# Patient Record
Sex: MALE | Race: WHITE | NOT HISPANIC OR LATINO
[De-identification: names, ages, dates, MRNs, and addresses within clinical notes are randomized per-mention and may not be internally consistent; named-entity substitution may affect disease eponyms.]

---

## 2014-12-31 RX ORDER — ASPIRIN/CALCIUM CARB/MAGNESIUM 324 MG
1 TABLET ORAL
Qty: 0 | Refills: 0 | COMMUNITY
Start: 2014-12-31

## 2014-12-31 RX ORDER — CARVEDILOL PHOSPHATE 80 MG/1
1 CAPSULE, EXTENDED RELEASE ORAL
Qty: 0 | Refills: 0 | COMMUNITY
Start: 2014-12-31

## 2017-01-20 ENCOUNTER — LABORATORY RESULT (OUTPATIENT)
Age: 67
End: 2017-01-20

## 2017-01-20 ENCOUNTER — APPOINTMENT (OUTPATIENT)
Age: 67
End: 2017-01-20

## 2017-01-20 VITALS
RESPIRATION RATE: 17 BRPM | TEMPERATURE: 98.1 F | SYSTOLIC BLOOD PRESSURE: 131 MMHG | HEIGHT: 69 IN | BODY MASS INDEX: 21.92 KG/M2 | DIASTOLIC BLOOD PRESSURE: 70 MMHG | HEART RATE: 63 BPM | WEIGHT: 148 LBS

## 2017-01-20 RX ORDER — PANTOPRAZOLE SODIUM 40 MG/1
40 TABLET, DELAYED RELEASE ORAL
Refills: 0 | Status: ACTIVE | COMMUNITY

## 2017-01-22 LAB
A1AT SERPL-MCNC: 171 MG/DL
AFP-TM SERPL-MCNC: 2.1 NG/ML
ALBUMIN SERPL ELPH-MCNC: 5 G/DL
ALP BLD-CCNC: 119 U/L
ALT SERPL-CCNC: 46 U/L
ANA SER IF-ACNC: NEGATIVE
ANION GAP SERPL CALC-SCNC: 16 MMOL/L
AST SERPL-CCNC: 51 U/L
BASOPHILS # BLD AUTO: 0.06 K/UL
BASOPHILS NFR BLD AUTO: 1.2 %
BILIRUB SERPL-MCNC: 0.8 MG/DL
BUN SERPL-MCNC: 26 MG/DL
CALCIUM SERPL-MCNC: 10.5 MG/DL
CARDIOLIPIN AB SER IA-ACNC: NEGATIVE
CERULOPLASMIN SERPL-MCNC: 32 MG/DL
CHLORIDE SERPL-SCNC: 95 MMOL/L
CO2 SERPL-SCNC: 24 MMOL/L
CREAT SERPL-MCNC: 1.08 MG/DL
DEPRECATED KAPPA LC FREE/LAMBDA SER: 1.35 RATIO
EOSINOPHIL # BLD AUTO: 0.51 K/UL
EOSINOPHIL NFR BLD AUTO: 10.5 %
FERRITIN SERPL-MCNC: 88 NG/ML
GLUCOSE SERPL-MCNC: 68 MG/DL
HAV IGG+IGM SER QL: REACTIVE
HBV CORE IGG+IGM SER QL: NONREACTIVE
HBV SURFACE AB SER QL: NONREACTIVE
HBV SURFACE AG SER QL: NONREACTIVE
HCT VFR BLD CALC: 32.8 %
HCV AB SER QL: NONREACTIVE
HCV S/CO RATIO: 0.15 S/CO
HGB BLD-MCNC: 11.5 G/DL
IGA SER QL IEP: 395 MG/DL
IGG SER QL IEP: 1490 MG/DL
IGM SER QL IEP: 114 MG/DL
IMM GRANULOCYTES NFR BLD AUTO: 0.2 %
KAPPA LC CSF-MCNC: 3.22 MG/DL
KAPPA LC SERPL-MCNC: 4.36 MG/DL
LKM AB SER QL IF: 0.9 UNITS
LYMPHOCYTES # BLD AUTO: 1.87 K/UL
LYMPHOCYTES NFR BLD AUTO: 38.4 %
MAN DIFF?: NORMAL
MCHC RBC-ENTMCNC: 34.1 PG
MCHC RBC-ENTMCNC: 35.1 GM/DL
MCV RBC AUTO: 97.3 FL
MONOCYTES # BLD AUTO: 0.86 K/UL
MONOCYTES NFR BLD AUTO: 17.7 %
NEUTROPHILS # BLD AUTO: 1.56 K/UL
NEUTROPHILS NFR BLD AUTO: 32 %
PLATELET # BLD AUTO: 85 K/UL
POTASSIUM SERPL-SCNC: 4.7 MMOL/L
PROT SERPL-MCNC: 8.6 G/DL
RBC # BLD: 3.37 M/UL
RBC # FLD: 14.2 %
SODIUM SERPL-SCNC: 135 MMOL/L
TSH SERPL-ACNC: 1.61 UIU/ML
TTG IGA SER IA-ACNC: 17.9 UNITS
TTG IGA SER-ACNC: NEGATIVE
WBC # FLD AUTO: 4.87 K/UL

## 2017-01-22 RX ORDER — AMLODIPINE BESYLATE 5 MG/1
5 TABLET ORAL
Refills: 0 | Status: ACTIVE | COMMUNITY

## 2017-01-22 RX ORDER — MENTHOL/CAMPHOR 0.5 %-0.5%
1000 LOTION (ML) TOPICAL
Refills: 0 | Status: ACTIVE | COMMUNITY

## 2017-01-22 RX ORDER — LOSARTAN POTASSIUM 50 MG/1
50 TABLET, FILM COATED ORAL
Refills: 0 | Status: ACTIVE | COMMUNITY

## 2017-01-22 RX ORDER — GLUCOSA SU 2KCL/CHONDROITIN SU 500-400 MG
500 CAPSULE ORAL
Refills: 0 | Status: ACTIVE | COMMUNITY

## 2017-01-22 RX ORDER — CARVEDILOL 12.5 MG/1
12.5 TABLET, FILM COATED ORAL
Refills: 0 | Status: ACTIVE | COMMUNITY

## 2017-01-22 RX ORDER — FOLIC ACID 1 MG/1
1 TABLET ORAL
Refills: 0 | Status: ACTIVE | COMMUNITY

## 2017-01-22 RX ORDER — ASPIRIN ENTERIC COATED TABLETS 81 MG 81 MG/1
81 TABLET, DELAYED RELEASE ORAL
Refills: 0 | Status: ACTIVE | COMMUNITY

## 2017-01-22 RX ORDER — GLIMEPIRIDE 4 MG/1
4 TABLET ORAL
Refills: 0 | Status: ACTIVE | COMMUNITY

## 2017-01-22 RX ORDER — GUAIFENESIN AND DEXTROMETHORPHAN HYDROBROMIDE 1200; 60 MG/1; MG/1
60-1200 TABLET, EXTENDED RELEASE ORAL
Refills: 0 | Status: ACTIVE | COMMUNITY

## 2017-01-22 RX ORDER — BISMUTH SUBSALICYLATE 525 MG/1
TABLET ORAL
Refills: 0 | Status: ACTIVE | COMMUNITY

## 2017-01-23 LAB — MPO AB + PR3 PNL SER: NORMAL

## 2017-01-26 LAB — SMOOTH MUSCLE AB SER QL IF: ABNORMAL

## 2017-01-27 LAB — SOLUBLE LIVER IGG SER IA-ACNC: < 20.1 UNITS

## 2017-01-31 ENCOUNTER — APPOINTMENT (OUTPATIENT)
Dept: ULTRASOUND IMAGING | Facility: IMAGING CENTER | Age: 67
End: 2017-01-31

## 2017-01-31 ENCOUNTER — OUTPATIENT (OUTPATIENT)
Dept: OUTPATIENT SERVICES | Facility: HOSPITAL | Age: 67
LOS: 1 days | End: 2017-01-31
Payer: MEDICARE

## 2017-01-31 DIAGNOSIS — R74.8 ABNORMAL LEVELS OF OTHER SERUM ENZYMES: ICD-10-CM

## 2017-01-31 PROCEDURE — 76700 US EXAM ABDOM COMPLETE: CPT

## 2017-02-08 RX ORDER — LORATADINE 5 MG/5 ML
10 SOLUTION, ORAL ORAL
Refills: 0 | Status: DISCONTINUED | COMMUNITY
End: 2017-02-08

## 2017-04-27 ENCOUNTER — APPOINTMENT (OUTPATIENT)
Age: 67
End: 2017-04-27

## 2017-04-27 VITALS
DIASTOLIC BLOOD PRESSURE: 67 MMHG | WEIGHT: 152 LBS | TEMPERATURE: 97.6 F | HEART RATE: 62 BPM | SYSTOLIC BLOOD PRESSURE: 130 MMHG | HEIGHT: 69 IN | BODY MASS INDEX: 22.51 KG/M2 | RESPIRATION RATE: 17 BRPM

## 2017-04-27 DIAGNOSIS — Z87.891 PERSONAL HISTORY OF NICOTINE DEPENDENCE: ICD-10-CM

## 2017-04-27 DIAGNOSIS — Z78.9 OTHER SPECIFIED HEALTH STATUS: ICD-10-CM

## 2017-05-17 ENCOUNTER — APPOINTMENT (OUTPATIENT)
Age: 67
End: 2017-05-17

## 2017-06-02 ENCOUNTER — TRANSCRIPTION ENCOUNTER (OUTPATIENT)
Age: 67
End: 2017-06-02

## 2017-07-06 ENCOUNTER — TRANSCRIPTION ENCOUNTER (OUTPATIENT)
Age: 67
End: 2017-07-06

## 2017-07-24 ENCOUNTER — APPOINTMENT (OUTPATIENT)
Age: 67
End: 2017-07-24

## 2017-07-24 ENCOUNTER — LABORATORY RESULT (OUTPATIENT)
Age: 67
End: 2017-07-24

## 2017-07-24 VITALS
TEMPERATURE: 97.7 F | SYSTOLIC BLOOD PRESSURE: 109 MMHG | HEIGHT: 69 IN | DIASTOLIC BLOOD PRESSURE: 64 MMHG | RESPIRATION RATE: 17 BRPM | HEART RATE: 62 BPM

## 2017-07-25 LAB
AFP-TM SERPL-MCNC: 2 NG/ML
ALBUMIN SERPL ELPH-MCNC: 4.9 G/DL
ALP BLD-CCNC: 105 U/L
ALT SERPL-CCNC: 50 U/L
ANION GAP SERPL CALC-SCNC: 18 MMOL/L
AST SERPL-CCNC: 38 U/L
BASOPHILS # BLD AUTO: 0.13 K/UL
BASOPHILS NFR BLD AUTO: 2.9 %
BILIRUB SERPL-MCNC: 0.5 MG/DL
BUN SERPL-MCNC: 22 MG/DL
CALCIUM SERPL-MCNC: 10.2 MG/DL
CHLORIDE SERPL-SCNC: 94 MMOL/L
CO2 SERPL-SCNC: 21 MMOL/L
CREAT SERPL-MCNC: 1.11 MG/DL
CRP SERPL-MCNC: <0.2 MG/DL
EOSINOPHIL # BLD AUTO: 0.87 K/UL
EOSINOPHIL NFR BLD AUTO: 20 %
ERYTHROCYTE [SEDIMENTATION RATE] IN BLOOD BY WESTERGREN METHOD: 3 MM/HR
GLUCOSE SERPL-MCNC: 114 MG/DL
HCT VFR BLD CALC: 31 %
HGB BLD-MCNC: 11 G/DL
INR PPP: 0.97 RATIO
LYMPHOCYTES # BLD AUTO: 1.16 K/UL
LYMPHOCYTES NFR BLD AUTO: 26.7 %
MAN DIFF?: NORMAL
MCHC RBC-ENTMCNC: 32.5 PG
MCHC RBC-ENTMCNC: 35.5 GM/DL
MCV RBC AUTO: 91.7 FL
MONOCYTES # BLD AUTO: 0.71 K/UL
MONOCYTES NFR BLD AUTO: 16.2 %
NEUTROPHILS # BLD AUTO: 1.41 K/UL
NEUTROPHILS NFR BLD AUTO: 32.3 %
PLATELET # BLD AUTO: 115 K/UL
POTASSIUM SERPL-SCNC: 5.5 MMOL/L
PROT SERPL-MCNC: 8.3 G/DL
PT BLD: 10.9 SEC
RBC # BLD: 3.38 M/UL
RBC # FLD: 13.8 %
SODIUM SERPL-SCNC: 133 MMOL/L
WBC # FLD AUTO: 4.36 K/UL

## 2017-07-27 RX ORDER — FUROSEMIDE 20 MG/1
20 TABLET ORAL
Refills: 0 | Status: DISCONTINUED | COMMUNITY
End: 2017-07-27

## 2017-08-11 ENCOUNTER — APPOINTMENT (OUTPATIENT)
Dept: MRI IMAGING | Facility: CLINIC | Age: 67
End: 2017-08-11
Payer: MEDICARE

## 2017-08-11 ENCOUNTER — OUTPATIENT (OUTPATIENT)
Dept: OUTPATIENT SERVICES | Facility: HOSPITAL | Age: 67
LOS: 1 days | End: 2017-08-11
Payer: MEDICARE

## 2017-08-11 DIAGNOSIS — Z00.8 ENCOUNTER FOR OTHER GENERAL EXAMINATION: ICD-10-CM

## 2017-08-11 DIAGNOSIS — R74.8 ABNORMAL LEVELS OF OTHER SERUM ENZYMES: ICD-10-CM

## 2017-08-11 PROCEDURE — A9585: CPT

## 2017-08-11 PROCEDURE — 74183 MRI ABD W/O CNTR FLWD CNTR: CPT

## 2017-08-11 PROCEDURE — 74183 MRI ABD W/O CNTR FLWD CNTR: CPT | Mod: 26

## 2017-09-06 ENCOUNTER — APPOINTMENT (OUTPATIENT)
Age: 67
End: 2017-09-06
Payer: MEDICARE

## 2017-09-06 VITALS
DIASTOLIC BLOOD PRESSURE: 66 MMHG | SYSTOLIC BLOOD PRESSURE: 128 MMHG | RESPIRATION RATE: 17 BRPM | HEIGHT: 69 IN | TEMPERATURE: 98.4 F | HEART RATE: 63 BPM

## 2017-09-06 PROCEDURE — 99214 OFFICE O/P EST MOD 30 MIN: CPT

## 2017-09-06 RX ORDER — MV-MIN/FOLIC/K1/LYCOPEN/LUTEIN 300-60 MCG
TABLET ORAL
Refills: 0 | Status: DISCONTINUED | COMMUNITY
End: 2017-09-06

## 2017-09-06 RX ORDER — GLUCOSAMINE/MSM/CHONDROIT SULF 500-166.6
1500 TABLET ORAL
Refills: 0 | Status: DISCONTINUED | COMMUNITY
End: 2017-09-06

## 2017-09-06 RX ORDER — OMEGA-3 FATTY ACIDS/FISH OIL 360-1200MG
1200 CAPSULE ORAL
Refills: 0 | Status: DISCONTINUED | COMMUNITY
End: 2017-09-06

## 2017-09-07 LAB
ALBUMIN SERPL ELPH-MCNC: 4.6 G/DL
ALP BLD-CCNC: 101 U/L
ALT SERPL-CCNC: 40 U/L
ANION GAP SERPL CALC-SCNC: 17 MMOL/L
AST SERPL-CCNC: 40 U/L
BASOPHILS # BLD AUTO: 0.06 K/UL
BASOPHILS NFR BLD AUTO: 1 %
BILIRUB SERPL-MCNC: 0.6 MG/DL
BUN SERPL-MCNC: 21 MG/DL
CALCIUM SERPL-MCNC: 10.1 MG/DL
CHLORIDE SERPL-SCNC: 97 MMOL/L
CO2 SERPL-SCNC: 22 MMOL/L
CREAT SERPL-MCNC: 1 MG/DL
EOSINOPHIL # BLD AUTO: 0.88 K/UL
EOSINOPHIL NFR BLD AUTO: 14.6 %
GLUCOSE SERPL-MCNC: 95 MG/DL
HCT VFR BLD CALC: 31.2 %
HGB BLD-MCNC: 10.9 G/DL
IMM GRANULOCYTES NFR BLD AUTO: 0.2 %
LYMPHOCYTES # BLD AUTO: 1.96 K/UL
LYMPHOCYTES NFR BLD AUTO: 32.6 %
MAN DIFF?: NORMAL
MCHC RBC-ENTMCNC: 32.4 PG
MCHC RBC-ENTMCNC: 34.9 GM/DL
MCV RBC AUTO: 92.9 FL
MONOCYTES # BLD AUTO: 0.9 K/UL
MONOCYTES NFR BLD AUTO: 15 %
NEUTROPHILS # BLD AUTO: 2.2 K/UL
NEUTROPHILS NFR BLD AUTO: 36.6 %
PLATELET # BLD AUTO: 101 K/UL
POTASSIUM SERPL-SCNC: 5.3 MMOL/L
PROT SERPL-MCNC: 8.6 G/DL
RBC # BLD: 3.36 M/UL
RBC # FLD: 14.2 %
SODIUM SERPL-SCNC: 136 MMOL/L
WBC # FLD AUTO: 6.01 K/UL

## 2017-10-30 ENCOUNTER — APPOINTMENT (OUTPATIENT)
Age: 67
End: 2017-10-30

## 2018-02-26 ENCOUNTER — OUTPATIENT (OUTPATIENT)
Dept: OUTPATIENT SERVICES | Facility: HOSPITAL | Age: 68
LOS: 1 days | End: 2018-02-26
Payer: MEDICARE

## 2018-02-26 ENCOUNTER — APPOINTMENT (OUTPATIENT)
Dept: MRI IMAGING | Facility: CLINIC | Age: 68
End: 2018-02-26
Payer: MEDICARE

## 2018-02-26 DIAGNOSIS — K74.60 UNSPECIFIED CIRRHOSIS OF LIVER: ICD-10-CM

## 2018-02-26 DIAGNOSIS — Z00.8 ENCOUNTER FOR OTHER GENERAL EXAMINATION: ICD-10-CM

## 2018-02-26 PROCEDURE — 82565 ASSAY OF CREATININE: CPT

## 2018-02-26 PROCEDURE — A9585: CPT

## 2018-02-26 PROCEDURE — 74183 MRI ABD W/O CNTR FLWD CNTR: CPT

## 2018-02-26 PROCEDURE — 74183 MRI ABD W/O CNTR FLWD CNTR: CPT | Mod: 26

## 2018-03-15 ENCOUNTER — APPOINTMENT (OUTPATIENT)
Age: 68
End: 2018-03-15
Payer: MEDICARE

## 2018-03-15 VITALS
BODY MASS INDEX: 23.55 KG/M2 | WEIGHT: 159 LBS | DIASTOLIC BLOOD PRESSURE: 63 MMHG | TEMPERATURE: 98 F | HEART RATE: 64 BPM | HEIGHT: 69 IN | SYSTOLIC BLOOD PRESSURE: 113 MMHG | RESPIRATION RATE: 14 BRPM

## 2018-03-15 DIAGNOSIS — K74.60 UNSPECIFIED CIRRHOSIS OF LIVER: ICD-10-CM

## 2018-03-15 DIAGNOSIS — R74.8 ABNORMAL LEVELS OF OTHER SERUM ENZYMES: ICD-10-CM

## 2018-03-15 DIAGNOSIS — R16.1 SPLENOMEGALY, NOT ELSEWHERE CLASSIFIED: ICD-10-CM

## 2018-03-15 LAB
ALBUMIN SERPL ELPH-MCNC: 4.9 G/DL
ALP BLD-CCNC: 85 U/L
ALT SERPL-CCNC: 36 U/L
ANION GAP SERPL CALC-SCNC: 16 MMOL/L
AST SERPL-CCNC: 35 U/L
BASOPHILS # BLD AUTO: 0.06 K/UL
BASOPHILS NFR BLD AUTO: 1.3 %
BILIRUB SERPL-MCNC: 0.6 MG/DL
BUN SERPL-MCNC: 26 MG/DL
CALCIUM SERPL-MCNC: 10.3 MG/DL
CHLORIDE SERPL-SCNC: 99 MMOL/L
CO2 SERPL-SCNC: 20 MMOL/L
CREAT SERPL-MCNC: 1.37 MG/DL
EOSINOPHIL # BLD AUTO: 0.71 K/UL
EOSINOPHIL NFR BLD AUTO: 15.4 %
GLUCOSE SERPL-MCNC: 207 MG/DL
HCT VFR BLD CALC: 30 %
HGB BLD-MCNC: 10.7 G/DL
IMM GRANULOCYTES NFR BLD AUTO: 0.2 %
INR PPP: 0.96 RATIO
LYMPHOCYTES # BLD AUTO: 1.53 K/UL
LYMPHOCYTES NFR BLD AUTO: 33.3 %
MAN DIFF?: NORMAL
MCHC RBC-ENTMCNC: 33.3 PG
MCHC RBC-ENTMCNC: 35.7 GM/DL
MCV RBC AUTO: 93.5 FL
MONOCYTES # BLD AUTO: 0.66 K/UL
MONOCYTES NFR BLD AUTO: 14.3 %
NEUTROPHILS # BLD AUTO: 1.63 K/UL
NEUTROPHILS NFR BLD AUTO: 35.5 %
PLATELET # BLD AUTO: 101 K/UL
POTASSIUM SERPL-SCNC: 4.9 MMOL/L
PROT SERPL-MCNC: 8.5 G/DL
PT BLD: 10.8 SEC
RBC # BLD: 3.21 M/UL
RBC # FLD: 14.2 %
SODIUM SERPL-SCNC: 135 MMOL/L
WBC # FLD AUTO: 4.6 K/UL

## 2018-03-15 PROCEDURE — 99214 OFFICE O/P EST MOD 30 MIN: CPT

## 2018-03-15 RX ORDER — AZELASTINE HYDROCHLORIDE 0.5 MG/ML
0.05 SOLUTION/ DROPS OPHTHALMIC
Refills: 0 | Status: ACTIVE | COMMUNITY

## 2018-03-15 RX ORDER — VALACYCLOVIR 500 MG/1
500 TABLET, FILM COATED ORAL
Qty: 21 | Refills: 0 | Status: ACTIVE | COMMUNITY
Start: 2017-03-23

## 2018-03-16 LAB — AFP-TM SERPL-MCNC: 1.9 NG/ML

## 2018-07-23 PROBLEM — Z78.9 ALCOHOL USE: Status: ACTIVE | Noted: 2017-01-20

## 2019-04-12 ENCOUNTER — TRANSCRIPTION ENCOUNTER (OUTPATIENT)
Age: 69
End: 2019-04-12

## 2019-05-01 ENCOUNTER — INPATIENT (INPATIENT)
Facility: HOSPITAL | Age: 69
LOS: 1 days | Discharge: HOME | DRG: 65 | End: 2019-05-03
Attending: PSYCHIATRY & NEUROLOGY | Admitting: PSYCHIATRY & NEUROLOGY
Payer: MEDICARE

## 2019-05-01 VITALS
DIASTOLIC BLOOD PRESSURE: 83 MMHG | SYSTOLIC BLOOD PRESSURE: 147 MMHG | HEIGHT: 69 IN | WEIGHT: 158.73 LBS | TEMPERATURE: 98 F | RESPIRATION RATE: 20 BRPM | OXYGEN SATURATION: 100 % | HEART RATE: 65 BPM

## 2019-05-01 DIAGNOSIS — I63.9 CEREBRAL INFARCTION, UNSPECIFIED: ICD-10-CM

## 2019-05-01 LAB
ALBUMIN SERPL ELPH-MCNC: 4.3 G/DL — SIGNIFICANT CHANGE UP (ref 3.3–5)
ALP SERPL-CCNC: 95 U/L — SIGNIFICANT CHANGE UP (ref 40–120)
ALT FLD-CCNC: 29 U/L — SIGNIFICANT CHANGE UP (ref 10–45)
ANION GAP SERPL CALC-SCNC: 13 MMOL/L — SIGNIFICANT CHANGE UP (ref 5–17)
APTT BLD: 28.7 SEC — SIGNIFICANT CHANGE UP (ref 27.5–36.3)
AST SERPL-CCNC: 37 U/L — SIGNIFICANT CHANGE UP (ref 10–40)
BASOPHILS # BLD AUTO: 0 K/UL — SIGNIFICANT CHANGE UP (ref 0–0.2)
BASOPHILS NFR BLD AUTO: 0.5 % — SIGNIFICANT CHANGE UP (ref 0–2)
BILIRUB SERPL-MCNC: 0.5 MG/DL — SIGNIFICANT CHANGE UP (ref 0.2–1.2)
BLD GP AB SCN SERPL QL: NEGATIVE — SIGNIFICANT CHANGE UP
BUN SERPL-MCNC: 18 MG/DL — SIGNIFICANT CHANGE UP (ref 7–23)
CALCIUM SERPL-MCNC: 9.9 MG/DL — SIGNIFICANT CHANGE UP (ref 8.4–10.5)
CHLORIDE SERPL-SCNC: 94 MMOL/L — LOW (ref 96–108)
CO2 SERPL-SCNC: 19 MMOL/L — LOW (ref 22–31)
CREAT SERPL-MCNC: 0.99 MG/DL — SIGNIFICANT CHANGE UP (ref 0.5–1.3)
EOSINOPHIL # BLD AUTO: 1.6 K/UL — HIGH (ref 0–0.5)
EOSINOPHIL NFR BLD AUTO: 25.4 % — HIGH (ref 0–6)
GLUCOSE BLDC GLUCOMTR-MCNC: 104 MG/DL — HIGH (ref 70–99)
GLUCOSE BLDC GLUCOMTR-MCNC: 90 MG/DL — SIGNIFICANT CHANGE UP (ref 70–99)
GLUCOSE SERPL-MCNC: 154 MG/DL — HIGH (ref 70–99)
HCT VFR BLD CALC: 29.9 % — LOW (ref 39–50)
HGB BLD-MCNC: 10.9 G/DL — LOW (ref 13–17)
INR BLD: 1.03 RATIO — SIGNIFICANT CHANGE UP (ref 0.88–1.16)
LG PLATELETS BLD QL AUTO: SLIGHT — SIGNIFICANT CHANGE UP
LYMPHOCYTES # BLD AUTO: 1.8 K/UL — SIGNIFICANT CHANGE UP (ref 1–3.3)
LYMPHOCYTES # BLD AUTO: 28 % — SIGNIFICANT CHANGE UP (ref 13–44)
MCHC RBC-ENTMCNC: 34.1 PG — HIGH (ref 27–34)
MCHC RBC-ENTMCNC: 36.5 GM/DL — HIGH (ref 32–36)
MCV RBC AUTO: 93.3 FL — SIGNIFICANT CHANGE UP (ref 80–100)
MONOCYTES # BLD AUTO: 1 K/UL — HIGH (ref 0–0.9)
MONOCYTES NFR BLD AUTO: 15.5 % — HIGH (ref 2–14)
NEUTROPHILS # BLD AUTO: 2 K/UL — SIGNIFICANT CHANGE UP (ref 1.8–7.4)
NEUTROPHILS NFR BLD AUTO: 30.6 % — LOW (ref 43–77)
PLAT MORPH BLD: NORMAL — SIGNIFICANT CHANGE UP
PLATELET # BLD AUTO: 128 K/UL — LOW (ref 150–400)
POTASSIUM SERPL-MCNC: 5.1 MMOL/L — SIGNIFICANT CHANGE UP (ref 3.5–5.3)
POTASSIUM SERPL-SCNC: 5.1 MMOL/L — SIGNIFICANT CHANGE UP (ref 3.5–5.3)
PROT SERPL-MCNC: 7.9 G/DL — SIGNIFICANT CHANGE UP (ref 6–8.3)
PROTHROM AB SERPL-ACNC: 11.7 SEC — SIGNIFICANT CHANGE UP (ref 10–12.9)
RBC # BLD: 3.2 M/UL — LOW (ref 4.2–5.8)
RBC # FLD: 12.9 % — SIGNIFICANT CHANGE UP (ref 10.3–14.5)
RBC BLD AUTO: SIGNIFICANT CHANGE UP
RH IG SCN BLD-IMP: POSITIVE — SIGNIFICANT CHANGE UP
SODIUM SERPL-SCNC: 126 MMOL/L — LOW (ref 135–145)
WBC # BLD: 6.2 K/UL — SIGNIFICANT CHANGE UP (ref 3.8–10.5)
WBC # FLD AUTO: 6.2 K/UL — SIGNIFICANT CHANGE UP (ref 3.8–10.5)

## 2019-05-01 PROCEDURE — 99284 EMERGENCY DEPT VISIT MOD MDM: CPT | Mod: GC,25

## 2019-05-01 PROCEDURE — 93010 ELECTROCARDIOGRAM REPORT: CPT | Mod: GC

## 2019-05-01 PROCEDURE — 70496 CT ANGIOGRAPHY HEAD: CPT | Mod: 26

## 2019-05-01 PROCEDURE — 70498 CT ANGIOGRAPHY NECK: CPT | Mod: 26

## 2019-05-01 RX ORDER — ALTEPLASE 100 MG
58.1 KIT INTRAVENOUS ONCE
Qty: 0 | Refills: 0 | Status: COMPLETED | OUTPATIENT
Start: 2019-05-01 | End: 2019-05-01

## 2019-05-01 RX ORDER — FOLIC ACID 0.8 MG
1 TABLET ORAL
Qty: 0 | Refills: 0 | COMMUNITY

## 2019-05-01 RX ORDER — AZELASTINE 137 UG/1
1 SPRAY, METERED NASAL
Qty: 0 | Refills: 0 | COMMUNITY

## 2019-05-01 RX ORDER — GLIMEPIRIDE 1 MG
1 TABLET ORAL
Qty: 0 | Refills: 0 | COMMUNITY

## 2019-05-01 RX ORDER — DEXTROSE 50 % IN WATER 50 %
12.5 SYRINGE (ML) INTRAVENOUS ONCE
Qty: 0 | Refills: 0 | Status: DISCONTINUED | OUTPATIENT
Start: 2019-05-01 | End: 2019-05-03

## 2019-05-01 RX ORDER — DEXTROSE 50 % IN WATER 50 %
15 SYRINGE (ML) INTRAVENOUS ONCE
Qty: 0 | Refills: 0 | Status: DISCONTINUED | OUTPATIENT
Start: 2019-05-01 | End: 2019-05-03

## 2019-05-01 RX ORDER — SODIUM CHLORIDE 9 MG/ML
1000 INJECTION, SOLUTION INTRAVENOUS
Qty: 0 | Refills: 0 | Status: DISCONTINUED | OUTPATIENT
Start: 2019-05-01 | End: 2019-05-03

## 2019-05-01 RX ORDER — INSULIN LISPRO 100/ML
VIAL (ML) SUBCUTANEOUS EVERY 6 HOURS
Qty: 0 | Refills: 0 | Status: DISCONTINUED | OUTPATIENT
Start: 2019-05-01 | End: 2019-05-02

## 2019-05-01 RX ORDER — ALTEPLASE 100 MG
6.5 KIT INTRAVENOUS ONCE
Qty: 0 | Refills: 0 | Status: COMPLETED | OUTPATIENT
Start: 2019-05-01 | End: 2019-05-01

## 2019-05-01 RX ORDER — GLUCAGON INJECTION, SOLUTION 0.5 MG/.1ML
1 INJECTION, SOLUTION SUBCUTANEOUS ONCE
Qty: 0 | Refills: 0 | Status: DISCONTINUED | OUTPATIENT
Start: 2019-05-01 | End: 2019-05-03

## 2019-05-01 RX ORDER — PANTOPRAZOLE SODIUM 20 MG/1
1 TABLET, DELAYED RELEASE ORAL
Qty: 0 | Refills: 0 | COMMUNITY

## 2019-05-01 RX ADMIN — ALTEPLASE 390 MILLIGRAM(S): KIT at 13:49

## 2019-05-01 RX ADMIN — ALTEPLASE 58.1 MILLIGRAM(S): KIT at 13:50

## 2019-05-01 NOTE — H&P ADULT - ATTENDING COMMENTS
68-year-old right-handed white gentleman first evaluated at [facility] on 5/2/19 with right hemiparesis. On 5/1/19, while at work, he felt mildly clumsy while walking. He then had sudden difficulty with his hand writing or holding objects in his right hand. His wife noted a subtle speech disturbance. He was treated with IV TPA. He has a history of 2 seizures years prior to admission, and had been followed by Dr. Cullen Garduno (neurology). He also has a history of CHF, ulcerative colitis, nonalcoholic cirrhosis, and thrombocytopenia. ROS otherwise negative. Exam. Alert and attentive;? Subtle dysarthria; fine finger movements subtly slowed on the right; no drift; deltoids 3/5 bilaterally, most likely due to rotator cuff injury, and otherwise power was 5/5 throughout; gait not tested; remainder of neurologic exam was nonfocal. Impression. On 5/1/19, he developed right hand weakness and clumsiness, and possibly more generalized right hemiparesis or imbalance, but this is uncertain, and was treated with IV TPA. His presentation is consistent with left brain dysfunction, perhaps a small infarct of uncertain mechanism. The possible relevance of inflammation and hypercoagulability related to ulcerative colitis is unknown. He also has mild thrombocytopenia which will need to be monitored in the setting of antiplatelet use. Plan. MRI brain; TTE; further management will be guided by results of MRI; aspirin (we'll avoid dual antiplatelet therapy given thrombocytopenia and cirrhosis); PT/OT.

## 2019-05-01 NOTE — H&P ADULT - NSHPPHYSICALEXAM_GEN_ALL_CORE
PHYSICAL EXAMINATION:  General: Well-developed, well nourished, in no acute distress.  Skin: no rash, no edema noted  Lung: no respiratory distress noted  Neurologic:  - Mental Status:  Alert, awake, oriented to person, place, and time; Speech is mildly hypophonic but fluent with intact naming, repetition, and comprehension; crosses midline, no extinction or neglect noted;   - Cranial Nerves II-XII:  VFF, EOMI, PERRLA, V1-V3 intact, no facial asymmetry, t/p midline, SCM/trap intact.  - Motor:  RUE 4-/5 w/ drift and weak , LUE orbiting around RUE; RLE 4+/5. Normal muscle bulk and tone throughout. No myoclonus or tremor.  - Sensory:  Intact to light touch throughout.  - Coordination:  Finger-nose-finger without dysmetria.  Rapid alternating hand movements slowed on RUE;   - Gait:   R leaning gait; romberg positive with delay of few seconds

## 2019-05-01 NOTE — ED ADULT NURSE NOTE - OBJECTIVE STATEMENT
68yom PMH dm2, htn, cad, cabg presents to ED c/o slowed speech and right hand weakness since 9:40 this morning. Code stoke called at 12:28. Pt at the ED not noted to have slurred speech. Minimal R sided facial droop. R sided hand weakness, but pt also states he had rotator surgery in the past. Denies SOB, CP. back pain, burning upon urination. Safety and comfort measures provided. Wife at bedside. 68yom PMH dm2, htn, cad, cabg presents to ED c/o slowed speech and right hand weakness since 9:40 this morning. Pt states he was writing and felt wekness in right hand. Code stoke called at 12:28. Pt at the ED not noted to have slurred speech. Minimal R sided facial droop. R sided hand weakness, but pt also states he had rotator surgery in the past. Denies SOB, CP. back pain, burning upon urination. Safety and comfort measures provided. Wife at bedside.    12:49, pt received tpa. MD Carvajal at bedside. See flowsheet for neuro checks. 68yom PMH dm2, htn, cad, cabg presents to ED c/o slowed speech and right hand weakness since 9:40 this morning. Pt states he was writing and felt wekness in right hand. Code stomax called at 12:28. Pt at the ED not noted to have slurred speech. Minimal R sided facial droop. R sided hand weakness, but pt also states he had rotator surgery in the past. Denies SOB, CP. back pain, burning upon urination. Safety and comfort measures provided. Wife at bedside.    13:49, pt received tpa. MD Carvajal at bedside. See flowsheet for neuro checks.  Finished TPA at ED 14:49. Face to face report with BERNADETTE Madera in ED.

## 2019-05-01 NOTE — ED ADULT TRIAGE NOTE - CHIEF COMPLAINT QUOTE
weakness of arms and legs b/l since 945am. slowed speech with R facial droop. code stroke initiated at 1228

## 2019-05-01 NOTE — PATIENT PROFILE ADULT - FUNCTIONAL SCREEN CURRENT LEVEL: COMMUNICATION, MLM
Offered pt shower for this afternoon, pt accepted.    0 = understands/communicates without difficulty

## 2019-05-01 NOTE — ED PROVIDER NOTE - OBJECTIVE STATEMENT
69yo male p/w slurred speech and right hand weakness since 945am. Pt. reports that he noticed that his writing was not as fluid. Denies any cp, sob, n/v, changes in vision. Pt. did reports some lower extremity weakness. 67yo male p/w slurred speech and right hand weakness since 945am. Pt. reports that he noticed that his writing was not as fluid. Denies any cp, sob, n/v, changes in vision. Pt. did reports some lower extremity weakness. Pt. has history of CAD, s/p CABG, HTN, DM, CHF, thrombocytopenia, cirrhosis.

## 2019-05-01 NOTE — ED PROVIDER NOTE - PMH
CAD (coronary artery disease)    CHF (congestive heart failure)    Cholelithiasis    Diabetes mellitus    HTN (hypertension)    Seizure    Ulcerative colitis

## 2019-05-01 NOTE — H&P ADULT - NSHPLABSRESULTS_GEN_ALL_CORE
10.9   6.2   )-----------( 128      ( 01 May 2019 12:41 )             29.9   05-01    126<L>  |  94<L>  |  18  ----------------------------<  154<H>  5.1   |  19<L>  |  0.99    Ca    9.9      01 May 2019 12:41    TPro  7.9  /  Alb  4.3  /  TBili  0.5  /  DBili  x   /  AST  37  /  ALT  29  /  AlkPhos  95  05-01    PT/INR - ( 01 May 2019 12:41 )   PT: 11.7 sec;   INR: 1.03 ratio         PTT - ( 01 May 2019 12:41 )  PTT:28.7 sec    CT/CTA -ve per Dr. Ball

## 2019-05-01 NOTE — H&P ADULT - NSICDXPASTMEDICALHX_GEN_ALL_CORE_FT
PAST MEDICAL HISTORY:  CAD (coronary artery disease)     CHF (congestive heart failure)     Cholelithiasis     Diabetes mellitus     HTN (hypertension)     Seizure     Ulcerative colitis

## 2019-05-01 NOTE — ED PROVIDER NOTE - ATTENDING CONTRIBUTION TO CARE
69 y/o male with the above documented history and HPI who on exam appears well and comfortable. VSs noted, head NC/AT, EOMsI, neck supple s/ audible bruit, lungs CTA, cardiac sounds c/ NADIA throughout precordium, abdomen soft, NT/ND, extremities s/ asymmetry, skin s/ rash and neurologically c/ mild RUE weakness but strong R hand grasp and otherwise intact. Stroke Code triggered. Appropriate screening studies obtained. Evaluated by Stroke Team. Decision made to give t-PA. To be admitted.

## 2019-05-01 NOTE — H&P ADULT - ASSESSMENT
67 yo RH male with PMHx of CAD s/p CABG x 4 (2000), HTN, steroid induced DM, UC (reported in remission as last bleed >20 years ago), 1x seizure (2013 deemed to medication induced), CHF, cholelithiasis, thrombocytopenia and non-alcoholic hepatic cirrhosis presents to ER with complaint of acute episode of difficulty writing with right hand.     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    - goal BP < 180/110  - Neuro checks q2hr in Stroke Unit  - NPO x 12hrs  - Dysphagia screen in 12-24hrs    Imaging/Studies  - MRI brain w/o cont  - MRA brain w/o cont   - MRA neck w/ cont  - Repeat Head CT or MRI in 24hrs evaluate for hemorrhagic conversion or get CTH earlier for change in mental status  - TTE w/ bubble  - Telemetry monitoring   - PT/ OT  - Speech and Swallow Evaluation    Labs  - HgA1c  - Lipid profile    Meds  - ASA 81mg and DVT PPx after 24hrs if no significant bleeding on repeat imaging  - Atorvastatin 80mg QHS    - Patient's and wife's questions and concerns addressed. They both voiced understanding.    case d/w stroke fellow Dr. Goodman 67 yo RH male with PMHx of CAD s/p CABG x 4 (2000), HTN, steroid induced DM, UC (reported in remission as last bleed >20 years ago), 1x seizure (2013 deemed to medication induced), CHF, cholelithiasis, thrombocytopenia and non-alcoholic hepatic cirrhosis presents to ER with complaint of acute episode of difficulty writing with right hand.     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    Post-TPA protocol  - goal BP < 180/110  - Neuro checks q2hr in Stroke Unit  - NPO x 12hrs  - Dysphagia screen in 12-24hrs    Imaging/Studies  - MRI brain w/o cont  - MRA brain w/o cont   - MRA neck w/ cont  - Repeat Head CT or MRI in 24hrs evaluate for hemorrhagic conversion or get CTH earlier for change in mental status  - TTE w/ bubble  - Telemetry monitoring   - PT/ OT  - Speech and Swallow Evaluation    Labs  - HgA1c  - Lipid profile    Meds  - ASA 81mg and DVT PPx after 24hrs if no significant bleeding on repeat imaging  - Atorvastatin 80mg QHS    - Patient's and wife's questions and concerns addressed. They both voiced understanding.    case d/w stroke fellow Dr. Goodman 67 yo RH male with PMHx of CAD s/p CABG x 4 (2000), HTN, steroid induced DM, UC (reported in remission as last bleed >20 years ago), 1x seizure (2013 deemed to medication induced), CHF, cholelithiasis, thrombocytopenia and non-alcoholic hepatic cirrhosis presents to ER with complaint of acute episode of difficulty writing with right hand.     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    Post-TPA protocol  - goal BP < 180/110  - Neuro checks q2hr in Stroke Unit  - NPO x 12hrs  - Dysphagia screen in 12-24hrs    Imaging/Studies  - MRI brain w/o cont  - Repeat Head CT or MRI in 24hrs evaluate for hemorrhagic conversion or get CTH earlier for change in mental status  - TTE w/ bubble  - Telemetry monitoring   - PT/ OT  - Speech and Swallow Evaluation    Labs  - HgA1c  - Lipid profile    Meds  - ASA 81mg and DVT PPx after 24hrs if no significant bleeding on repeat imaging  - Atorvastatin 80mg QHS  ***med rec hotline called - please reconcile    - Patient's and wife's questions and concerns addressed. They both voiced understanding.    case d/w stroke fellow Dr. Goodman

## 2019-05-01 NOTE — H&P ADULT - HISTORY OF PRESENT ILLNESS
67 yo RH male with PMHx of CAD s/p CABG x 4 (2000), HTN, steroid induced DM, UC (reported in remission as last bleed >20 years ago), 1x seizure (2013 deemed to medication induced), CHF, cholelithiasis, thrombocytopenia and non-alcoholic hepatic cirrhosis presents to ER with complaint of acute episode of difficulty writing with right hand. Sx onset/LNW 9:45AM 5/1/2019. Pt and wife report he writes very fast usually but this morning could not even grasp the pen properly and was unable to open car door or get out of vehicle. Pt also endorsed feeling generally weak but worse on the right. Per wife pt was also speaking slowly. Delay in coming to ER occurred as pt confided to wife at a late while at work. Exam significant for mild-mod RUE and RLE weakness ( weaker) w/ subtle drift, and leaning to right while walking. CTH/CTA were without acute findings. Pt was deemed tPA candidate. Extensive discussion of risks vs. benefits were done with patient and wife, including ample time given for them to weigh the options presented. 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 tPA Bolused at 1:49PM.   NIHSS =1, MRS =0  Patient denies trauma, LOC, fever, chills, HA, vision changes, tinnitus, dysarthria, dysphagia, dizziness, chest pain, palpitations, SOB, nausea, vomiting, diarrhea, dysuria and incontinence.

## 2019-05-02 DIAGNOSIS — I63.9 CEREBRAL INFARCTION, UNSPECIFIED: ICD-10-CM

## 2019-05-02 DIAGNOSIS — E11.9 TYPE 2 DIABETES MELLITUS WITHOUT COMPLICATIONS: ICD-10-CM

## 2019-05-02 DIAGNOSIS — I25.10 ATHEROSCLEROTIC HEART DISEASE OF NATIVE CORONARY ARTERY WITHOUT ANGINA PECTORIS: ICD-10-CM

## 2019-05-02 DIAGNOSIS — I10 ESSENTIAL (PRIMARY) HYPERTENSION: ICD-10-CM

## 2019-05-02 LAB
ANION GAP SERPL CALC-SCNC: 14 MMOL/L — SIGNIFICANT CHANGE UP (ref 5–17)
APPEARANCE UR: CLEAR — SIGNIFICANT CHANGE UP
BACTERIA # UR AUTO: NEGATIVE — SIGNIFICANT CHANGE UP
BILIRUB UR-MCNC: NEGATIVE — SIGNIFICANT CHANGE UP
BUN SERPL-MCNC: 16 MG/DL — SIGNIFICANT CHANGE UP (ref 7–23)
CALCIUM SERPL-MCNC: 10 MG/DL — SIGNIFICANT CHANGE UP (ref 8.4–10.5)
CHLORIDE SERPL-SCNC: 96 MMOL/L — SIGNIFICANT CHANGE UP (ref 96–108)
CO2 SERPL-SCNC: 21 MMOL/L — LOW (ref 22–31)
COLOR SPEC: YELLOW — SIGNIFICANT CHANGE UP
CREAT SERPL-MCNC: 1.04 MG/DL — SIGNIFICANT CHANGE UP (ref 0.5–1.3)
DIFF PNL FLD: SIGNIFICANT CHANGE UP
EPI CELLS # UR: 0 /HPF — SIGNIFICANT CHANGE UP (ref 0–5)
FOLATE SERPL-MCNC: >20 NG/ML — SIGNIFICANT CHANGE UP
GLUCOSE BLDC GLUCOMTR-MCNC: 103 MG/DL — HIGH (ref 70–99)
GLUCOSE BLDC GLUCOMTR-MCNC: 185 MG/DL — HIGH (ref 70–99)
GLUCOSE BLDC GLUCOMTR-MCNC: 207 MG/DL — HIGH (ref 70–99)
GLUCOSE BLDC GLUCOMTR-MCNC: 232 MG/DL — HIGH (ref 70–99)
GLUCOSE SERPL-MCNC: 100 MG/DL — HIGH (ref 70–99)
GLUCOSE UR QL: NEGATIVE — SIGNIFICANT CHANGE UP
HCT VFR BLD CALC: 28.3 % — LOW (ref 39–50)
HCT VFR BLD CALC: 28.5 % — LOW (ref 39–50)
HGB BLD-MCNC: 10.3 G/DL — LOW (ref 13–17)
HGB BLD-MCNC: 10.4 G/DL — LOW (ref 13–17)
HYALINE CASTS # UR AUTO: 0 /LPF — SIGNIFICANT CHANGE UP (ref 0–7)
KETONES UR-MCNC: ABNORMAL
LEUKOCYTE ESTERASE UR-ACNC: NEGATIVE — SIGNIFICANT CHANGE UP
MCHC RBC-ENTMCNC: 33.6 PG — SIGNIFICANT CHANGE UP (ref 27–34)
MCHC RBC-ENTMCNC: 33.7 PG — SIGNIFICANT CHANGE UP (ref 27–34)
MCHC RBC-ENTMCNC: 36.3 GM/DL — HIGH (ref 32–36)
MCHC RBC-ENTMCNC: 36.6 GM/DL — HIGH (ref 32–36)
MCV RBC AUTO: 92.1 FL — SIGNIFICANT CHANGE UP (ref 80–100)
MCV RBC AUTO: 92.7 FL — SIGNIFICANT CHANGE UP (ref 80–100)
NITRITE UR-MCNC: NEGATIVE — SIGNIFICANT CHANGE UP
PH UR: 6 — SIGNIFICANT CHANGE UP (ref 5–8)
PLATELET # BLD AUTO: 102 K/UL — LOW (ref 150–400)
PLATELET # BLD AUTO: 124 K/UL — LOW (ref 150–400)
POTASSIUM SERPL-MCNC: 4.4 MMOL/L — SIGNIFICANT CHANGE UP (ref 3.5–5.3)
POTASSIUM SERPL-SCNC: 4.4 MMOL/L — SIGNIFICANT CHANGE UP (ref 3.5–5.3)
PROT UR-MCNC: ABNORMAL
RBC # BLD: 3.05 M/UL — LOW (ref 4.2–5.8)
RBC # BLD: 3.09 M/UL — LOW (ref 4.2–5.8)
RBC # FLD: 12.7 % — SIGNIFICANT CHANGE UP (ref 10.3–14.5)
RBC # FLD: 12.7 % — SIGNIFICANT CHANGE UP (ref 10.3–14.5)
RBC CASTS # UR COMP ASSIST: 1 /HPF — SIGNIFICANT CHANGE UP (ref 0–4)
SODIUM SERPL-SCNC: 131 MMOL/L — LOW (ref 135–145)
SP GR SPEC: 1.02 — SIGNIFICANT CHANGE UP (ref 1.01–1.02)
T3 SERPL-MCNC: 103 NG/DL — SIGNIFICANT CHANGE UP (ref 80–200)
T4 AB SER-ACNC: 8.2 UG/DL — SIGNIFICANT CHANGE UP (ref 4.6–12)
TSH SERPL-MCNC: 1.37 UIU/ML — SIGNIFICANT CHANGE UP (ref 0.27–4.2)
UROBILINOGEN FLD QL: SIGNIFICANT CHANGE UP
VIT B12 SERPL-MCNC: >2000 PG/ML — HIGH (ref 232–1245)
WBC # BLD: 4.7 K/UL — SIGNIFICANT CHANGE UP (ref 3.8–10.5)
WBC # BLD: 7.6 K/UL — SIGNIFICANT CHANGE UP (ref 3.8–10.5)
WBC # FLD AUTO: 4.7 K/UL — SIGNIFICANT CHANGE UP (ref 3.8–10.5)
WBC # FLD AUTO: 7.6 K/UL — SIGNIFICANT CHANGE UP (ref 3.8–10.5)
WBC UR QL: 0 /HPF — SIGNIFICANT CHANGE UP (ref 0–5)

## 2019-05-02 PROCEDURE — 70551 MRI BRAIN STEM W/O DYE: CPT | Mod: 26

## 2019-05-02 PROCEDURE — 99223 1ST HOSP IP/OBS HIGH 75: CPT

## 2019-05-02 RX ORDER — INSULIN LISPRO 100/ML
VIAL (ML) SUBCUTANEOUS AT BEDTIME
Qty: 0 | Refills: 0 | Status: DISCONTINUED | OUTPATIENT
Start: 2019-05-02 | End: 2019-05-03

## 2019-05-02 RX ORDER — ASPIRIN/CALCIUM CARB/MAGNESIUM 324 MG
81 TABLET ORAL DAILY
Qty: 0 | Refills: 0 | Status: DISCONTINUED | OUTPATIENT
Start: 2019-05-02 | End: 2019-05-03

## 2019-05-02 RX ORDER — ENOXAPARIN SODIUM 100 MG/ML
40 INJECTION SUBCUTANEOUS DAILY
Qty: 0 | Refills: 0 | Status: DISCONTINUED | OUTPATIENT
Start: 2019-05-02 | End: 2019-05-03

## 2019-05-02 RX ORDER — ACETAMINOPHEN 500 MG
650 TABLET ORAL EVERY 6 HOURS
Qty: 0 | Refills: 0 | Status: DISCONTINUED | OUTPATIENT
Start: 2019-05-02 | End: 2019-05-03

## 2019-05-02 RX ORDER — INSULIN LISPRO 100/ML
VIAL (ML) SUBCUTANEOUS
Qty: 0 | Refills: 0 | Status: DISCONTINUED | OUTPATIENT
Start: 2019-05-02 | End: 2019-05-03

## 2019-05-02 RX ADMIN — Medication 650 MILLIGRAM(S): at 21:02

## 2019-05-02 RX ADMIN — Medication 1: at 13:05

## 2019-05-02 RX ADMIN — ENOXAPARIN SODIUM 40 MILLIGRAM(S): 100 INJECTION SUBCUTANEOUS at 17:50

## 2019-05-02 RX ADMIN — Medication 81 MILLIGRAM(S): at 17:51

## 2019-05-02 RX ADMIN — Medication 650 MILLIGRAM(S): at 20:30

## 2019-05-02 RX ADMIN — Medication 2: at 17:51

## 2019-05-02 NOTE — OCCUPATIONAL THERAPY INITIAL EVALUATION ADULT - BALANCE TRAINING, PT EVAL
GOAL: Patient will increase static/dynamic standing balance by 1/2 grade to facilitate increased ability to perform ADLs and functional mobility

## 2019-05-02 NOTE — OCCUPATIONAL THERAPY INITIAL EVALUATION ADULT - PERTINENT HX OF CURRENT PROBLEM, REHAB EVAL
Pt is a 67 y/o RH male with PMHx of CAD s/p CABG x 4 (2000), HTN, steroid induced DM, UC (reported in remission as last bleed >20 years ago), 1x seizure (2013 deemed to medication induced), CHF, cholelithiasis, thrombocytopenia and non-alcoholic hepatic cirrhosis presents to ER with complaint of acute episode of difficulty writing with right hand. Sx onset/LNW 9:45AM 5/1/2019.

## 2019-05-02 NOTE — PHYSICAL THERAPY INITIAL EVALUATION ADULT - CRITERIA FOR SKILLED THERAPEUTIC INTERVENTIONS
predicted duration of therapy intervention/risk reduction/prevention/impairments found/rehab potential/therapy frequency/anticipated discharge recommendation

## 2019-05-02 NOTE — OCCUPATIONAL THERAPY INITIAL EVALUATION ADULT - DIAGNOSIS, OT EVAL
decreases fmc of right hand impacting handwriting, decreased dynamic standing balance during fuctional activities

## 2019-05-02 NOTE — DIETITIAN INITIAL EVALUATION ADULT. - PERTINENT MEDS FT
MEDICATIONS  (STANDING):  dextrose 5%. 1000 milliLiter(s) (50 mL/Hr) IV Continuous <Continuous>  dextrose 50% Injectable 12.5 Gram(s) IV Push once  insulin lispro (HumaLOG) corrective regimen sliding scale   SubCutaneous three times a day before meals  insulin lispro (HumaLOG) corrective regimen sliding scale   SubCutaneous at bedtime    MEDICATIONS  (PRN):  dextrose 40% Gel 15 Gram(s) Oral once PRN Blood Glucose LESS THAN 70 milliGRAM(s)/deciliter  glucagon  Injectable 1 milliGRAM(s) IntraMuscular once PRN Glucose LESS THAN 70 milligrams/deciliter

## 2019-05-02 NOTE — OCCUPATIONAL THERAPY INITIAL EVALUATION ADULT - PRECAUTIONS/LIMITATIONS, REHAB EVAL
no known precautions/limitations/Pt and wife report he writes very fast usually but this morning could not even grasp the pen properly and was unable to open car door or get out of vehicle. Pt also endorsed feeling generally weak but worse on the right. Per wife pt was also speaking slowly. Delay in coming to ER occurred as pt confided to wife at a late while at work. Exam significant for mild-mod RUE and RLE weakness ( weaker) w/ subtle drift, and leaning to right while walking. CTH/CTA were without acute findings. Pt was deemed tPA candidate.

## 2019-05-02 NOTE — PHYSICAL THERAPY INITIAL EVALUATION ADULT - PLANNED THERAPY INTERVENTIONS, PT EVAL
gait training/balance training/Stair Training: Goal: Improve to 10 steps I with single rail, step over step pattern by 4 weeks.

## 2019-05-02 NOTE — PHYSICAL THERAPY INITIAL EVALUATION ADULT - IMPAIRMENTS FOUND, PT EVAL
gait, locomotion, and balance/aerobic capacity/endurance/fine motor/arousal, attention, and cognition/muscle strength

## 2019-05-02 NOTE — OCCUPATIONAL THERAPY INITIAL EVALUATION ADULT - FINE MOTOR COORDINATION, FINE MOTOR COORDINATION TESTS, OT EVAL
Pt demonstrated ability to tie a bow, open water bottle, small toothpaste, roll a coin between fingers and flip a coin. Pt showed deficits in handwriting speed and accuracy

## 2019-05-02 NOTE — CONSULT NOTE ADULT - SUBJECTIVE AND OBJECTIVE BOX
CHIEF COMPLAINT:  CVA     HISTORY OF PRESENT ILLNESS:  69 yo male admitted to the Stroke unit. He has PMHx of CAD s/p CABG x 4 (2000), HTN, steroid induced DM, UC (reported in remission as last bleed >20 years ago), 1x seizure (2013 deemed to medication induced), CHF, cholelithiasis, thrombocytopenia and non-alcoholic hepatic cirrhosis presents to ER with complaint of acute episode of difficulty writing with right hand. Sx onset/LNW 9:45AM 5/1/2019. Pt and wife report he writes very fast usually but this morning could not even grasp the pen properly and was unable to open car door or get out of vehicle. Pt also endorsed feeling generally weak but worse on the right. Per wife pt was also speaking slowly. Delay in coming to ER occurred as pt confided to wife at a late while at work. Exam significant for mild-mod RUE and RLE weakness ( weaker) w/ subtle drift, and leaning to right while walking. CTH/CTA were without acute findings. Pt was deemed tPA candidate. Extensive discussion of risks vs. benefits were done with patient and wife, including ample time given for them to weigh the options presented. 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 tPA Bolused at 1:49PM.   NIHSS =1, MRS =0  Patient denies trauma, LOC, fever, chills, HA, vision changes, tinnitus, dysarthria, dysphagia, dizziness, chest pain, palpitations, SOB, nausea, vomiting, diarrhea, dysuria and incontinence    PAST MEDICAL & SURGICAL HISTORY:  Cholelithiasis  Seizure  CHF (congestive heart failure)  Ulcerative colitis  Diabetes mellitus  HTN (hypertension)  CAD (coronary artery disease)  S/P laminectomy  S/P CABG x 4          MEDICATIONS:            dextrose 40% Gel 15 Gram(s) Oral once PRN  dextrose 50% Injectable 12.5 Gram(s) IV Push once  glucagon  Injectable 1 milliGRAM(s) IntraMuscular once PRN  insulin lispro (HumaLOG) corrective regimen sliding scale   SubCutaneous three times a day before meals  insulin lispro (HumaLOG) corrective regimen sliding scale   SubCutaneous at bedtime    dextrose 5%. 1000 milliLiter(s) IV Continuous <Continuous>      FAMILY HISTORY:      SOCIAL HISTORY:    [ ] Non-smoker  [ ] Smoker  [ ] Alcohol    Allergies    Bactrim (Unknown)  Levaquin (Unknown)  NSAIDs (Rash)  predniSONE (Unknown)  statins (Swelling; Rash)    Intolerances    	    REVIEW OF SYSTEMS:  CONSTITUTIONAL: No fever, weight loss, + fatigue  EYES: No eye pain, visual disturbances, or discharge  ENMT:  No difficulty hearing, tinnitus, vertigo; No sinus or throat pain  NECK: No pain or stiffness  RESPIRATORY: No cough, wheezing, chills or hemoptysis; No Shortness of Breath  CARDIOVASCULAR: No chest pain, palpitations, passing out, dizziness, or leg swelling  GASTROINTESTINAL: No abdominal or epigastric pain. No nausea, vomiting, or hematemesis; No diarrhea or constipation. No melena or hematochezia.  GENITOURINARY: No dysuria, frequency, hematuria, or incontinence  NEUROLOGICAL: No headaches, memory ++loss, loss of strength, numbness, or tremors  SKIN: No itching, burning, rashes, or lesions   LYMPH Nodes: No enlarged glands  ENDOCRINE: No heat or cold intolerance; No hair loss  MUSCULOSKELETAL: No joint pain or swelling; No muscle, back, or extremity pain  PSYCHIATRIC: No depression, anxiety, mood swings, or difficulty sleeping  HEME/LYMPH: No easy bruising, or bleeding gums  ALLERY AND IMMUNOLOGIC: No hives or eczema	    [ ] All others negative	  [ ] Unable to obtain    PHYSICAL EXAM:  T(C): 36.7 (05-02-19 @ 02:00), Max: 36.8 (05-01-19 @ 20:00)  HR: 88 (05-02-19 @ 11:00) (54 - 89)  BP: 150/66 (05-02-19 @ 11:00) (115/53 - 181/70)  RR: 25 (05-02-19 @ 11:00) (13 - 25)  SpO2: 96% (05-02-19 @ 11:00) (96% - 100%)  Wt(kg): --  I&O's Summary    01 May 2019 07:01  -  02 May 2019 07:00  --------------------------------------------------------  IN: 0 mL / OUT: 1900 mL / NET: -1900 mL        Appearance: NAD	  HEENT:   Normal oral mucosa, PERRL, EOMI	  Lymphatic: No lymphadenopathy  Cardiovascular: Normal S1 S2, No JVD, No murmurs, No edema  Respiratory: Lungs clear to auscultation	  Psychiatry: A & O x 3, Mood & affect appropriate  Gastrointestinal:  Soft, Non-tender, + BS	  Skin: No rashes, No ecchymoses, No cyanosis	  Neurologic: Non-focal  Extremities: Decreased range of motion, No clubbing, cyanosis or edema  Vascular: Peripheral pulses palpable 2+ bilaterally    TELEMETRY: 	SR    ECG:  	NSR, first degree AVB, non specific stt changes   RADIOLOGY:          < from: CT Angio Head w/ IV Cont (05.01.19 @ 12:59) >    EXAM:  CT BRAIN STROKE PROTOCOL                          EXAM:  CT ANGIO BRAIN (W)AW IC                          EXAM:  CT ANGIO NECK (W)AW IC                            PROCEDURE DATE:  05/01/2019            INTERPRETATION:    Clinical Indication:Code stroke, right-sided disc and slower speech    5mm axial sections of the brain were obtained from base to vertex,   without the intravenous administration of contrast material. Coronal and   sagittal computer generated reconstructed are available.          The ventricles and sulci are prominent consistent age appropriate   involutional changes. Small vessel white matter ischemic changes are   noted. There is no hemorrhage, mass, or shift of midline structures. Bone   window examination is unremarkable.            After the intravenous power injection of 70 cc of Omnipaque 300 using a   bolus kimberlyn timing run, serial thin sections were obtained through the   neck from the thoracic inlet through the intracranial circulation   centered at the [id number] on a 64 slice CT scanner reformatted   with coronal and sagittal 2 D-MIP projections, including 3 D   reconstructions using a separate 3D [employer] software workstation. A total   of 70 cc of Omnipaque were intravenously injected. 30 cc were discarded.      The origins of the carotid vertebral arteries are normal. The carotid   bifurcations are unremarkable bilaterally. The right vertebral artery is   dominant.       The distal vertebral arteries are well identified as are the   posterior-inferior cerebellar arteries bilaterally. The region of the   vertebral basilar junction is normal. The basilar artery is normal. The   posterior cerebral and superior cerebellar arteries are normal.          Evaluation of the carotid arteriesdemonstrate normal appearance to the   distal cervical, petrous cavernous and supraclinoid internal carotid   arteries, anterior and middle cerebral arteries.    There is no evidence of aneurysm, stenosis, or vessel occlusion.       MR venography demonstrates no evidence of venous thrombosis. The normal   intracranial venous circulation is identified. The right transverse sinus   is dominant. The superior sagittal sinus, internal cerebral veins, vein   of Song, straight sinus, transverse sinuses, sigmoid sinuses and   internal jugular veins are normal.      IMPRESSION: Unremarkable noncontrast brain CT. Normal CTA of the head and   neck. No intracranial or extra cranial stenosis.      Dr. Soler discussed these findings with neurology resident on 5/1/2019   12:57 PM with read back.                    HALINA SOLER M.D., ATTENDING RADIOLOGIST  This document has been electronically signed. May  1 2019  2:07PM                < end of copied text >    OTHER: 	  	  LABS:	 	    CARDIAC MARKERS:                                  10.4   4.7   )-----------( 102      ( 02 May 2019 04:54 )             28.5     05-02    131<L>  |  96  |  16  ----------------------------<  100<H>  4.4   |  21<L>  |  1.04    Ca    10.0      02 May 2019 04:54    TPro  7.9  /  Alb  4.3  /  TBili  0.5  /  DBili  x   /  AST  37  /  ALT  29  /  AlkPhos  95  05-01    proBNP:   Lipid Profile:   HgA1c:   TSH: Thyroid Stimulating Hormone, Serum: 1.37 uIU/mL (05-02 @ 08:33)

## 2019-05-02 NOTE — PHYSICAL THERAPY INITIAL EVALUATION ADULT - GENERAL OBSERVATIONS, REHAB EVAL
Pt was recd supine VSS, NAD, +tele, wife present t/o. Pt was recd supine, VSS, NAD, +tele, OT sherra present and wife present t/o.

## 2019-05-02 NOTE — DIETITIAN INITIAL EVALUATION ADULT. - ADHERENCE
Restricts carbohydrate intake, eats 3 meals, no snacks, drinks water, tea. Checks BG 3 times/day, range , states BG sometimes goes into 50s has no symptoms of hypoglycemia, drinks juice to raise BG. A1c pending. Has possible milk protein allergy

## 2019-05-02 NOTE — PHYSICAL THERAPY INITIAL EVALUATION ADULT - PERTINENT HX OF CURRENT PROBLEM, REHAB EVAL
Pt is a 69 y/o RH male with PMHx of CAD s/p CABG x 4 (2000), HTN, steroid induced DM, UC (reported in remission as last bleed >20 years ago), 1x seizure (2013 deemed to medication induced), CHF, cholelithiasis, thrombocytopenia and non-alcoholic hepatic cirrhosis presents to ER with complaint of acute episode of difficulty writing with right hand. Sx onset/LNW 9:45AM 5/1/2019.

## 2019-05-02 NOTE — PHYSICAL THERAPY INITIAL EVALUATION ADULT - ADDITIONAL COMMENTS
Pt lives in a 3 level house with wife and son. 3 steps to enter BHR, 13 steps to 2nd floor UHR, 13 steps to 3rd floor BHR. Pt does have handicapped entrance/bathroom that is recently been vacated. Pt is currently working as a . Pt wife and son is able to assist with groceries and medication  upon return.

## 2019-05-02 NOTE — DIETITIAN INITIAL EVALUATION ADULT. - OTHER INFO
Pt seen for:  Stroke Unit length of stay  Adm dx:  CVA   GI issues: no N/V   Last BM: 4/30     Food allergies/Intolerances: w/u for milk protein allergy    Vit/supplement PTA: vitamin D, B12, folic acid

## 2019-05-02 NOTE — PHYSICAL THERAPY INITIAL EVALUATION ADULT - PRECAUTIONS/LIMITATIONS, REHAB EVAL
Pt and wife report he writes very fast usually but this morning could not even grasp the pen properly and was unable to open car door or get out of vehicle. Pt also endorsed feeling generally weak but worse on the right. Per wife pt was also speaking slowly. Delay in coming to ER occurred as pt confided to wife at a late while at work. Exam significant for mild-mod RUE and RLE weakness ( weaker) w/ subtle drift, and leaning to right while walking. CTH/CTA were without acute findings. Pt was deemed tPA candidate. Extensive discussion of risks vs. benefits were done with patient and wife, including ample time given for them to weigh the options presented. 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 tPA Bolused at 1:49PM.  Plan: MRI, repeat CT, TTE with bubble. fall precautions/Pt and wife report he writes very fast usually but this morning could not even grasp the pen properly and was unable to open car door or get out of vehicle. Pt also endorsed feeling generally weak but worse on the right. Per wife pt was also speaking slowly. Delay in coming to ER occurred as pt confided to wife at a late while at work. Exam significant for mild-mod RUE and RLE weakness ( weaker) w/ subtle drift, and leaning to right while walking. CTH/CTA were without acute findings. Pt was deemed tPA candidate. Extensive discussion of risks vs. benefits were done with patient and wife, including ample time given for them to weigh the options presented. 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 tPA Bolused at 1:49PM.  Plan: MRI, repeat CT, TTE with bubble.

## 2019-05-02 NOTE — OCCUPATIONAL THERAPY INITIAL EVALUATION ADULT - ADDITIONAL COMMENTS
Pt and wife live in 3 story home, 3ste, 13 steps to second and third floor with stall shower. Pt reports there is a handicap accessible bathroom on first floor (from previous household member) should the need arise

## 2019-05-02 NOTE — DIETITIAN INITIAL EVALUATION ADULT. - ENERGY NEEDS
Ht: 69"  Wt: 154   BMI: 23.4  kg/m2   IBW: 160 (+/-10%)     within IBW  Edema: none        Skin: no pressure injuries documented

## 2019-05-03 ENCOUNTER — TRANSCRIPTION ENCOUNTER (OUTPATIENT)
Age: 69
End: 2019-05-03

## 2019-05-03 VITALS
HEART RATE: 88 BPM | DIASTOLIC BLOOD PRESSURE: 64 MMHG | OXYGEN SATURATION: 100 % | RESPIRATION RATE: 24 BRPM | SYSTOLIC BLOOD PRESSURE: 129 MMHG

## 2019-05-03 LAB
ANION GAP SERPL CALC-SCNC: 16 MMOL/L — SIGNIFICANT CHANGE UP (ref 5–17)
BUN SERPL-MCNC: 18 MG/DL — SIGNIFICANT CHANGE UP (ref 7–23)
CALCIUM SERPL-MCNC: 10 MG/DL — SIGNIFICANT CHANGE UP (ref 8.4–10.5)
CHLORIDE SERPL-SCNC: 90 MMOL/L — LOW (ref 96–108)
CHOLEST SERPL-MCNC: 150 MG/DL — SIGNIFICANT CHANGE UP (ref 10–199)
CO2 SERPL-SCNC: 22 MMOL/L — SIGNIFICANT CHANGE UP (ref 22–31)
CREAT SERPL-MCNC: 1.18 MG/DL — SIGNIFICANT CHANGE UP (ref 0.5–1.3)
GLUCOSE BLDC GLUCOMTR-MCNC: 206 MG/DL — HIGH (ref 70–99)
GLUCOSE BLDC GLUCOMTR-MCNC: 227 MG/DL — HIGH (ref 70–99)
GLUCOSE SERPL-MCNC: 178 MG/DL — HIGH (ref 70–99)
HBA1C BLD-MCNC: 5.8 % — HIGH (ref 4–5.6)
HCT VFR BLD CALC: 31.2 % — LOW (ref 39–50)
HDLC SERPL-MCNC: 31 MG/DL — LOW
HGB BLD-MCNC: 10.6 G/DL — LOW (ref 13–17)
LIPID PNL WITH DIRECT LDL SERPL: 82 MG/DL — SIGNIFICANT CHANGE UP
MCHC RBC-ENTMCNC: 31.6 PG — SIGNIFICANT CHANGE UP (ref 27–34)
MCHC RBC-ENTMCNC: 34 GM/DL — SIGNIFICANT CHANGE UP (ref 32–36)
MCV RBC AUTO: 93.1 FL — SIGNIFICANT CHANGE UP (ref 80–100)
PLATELET # BLD AUTO: 124 K/UL — LOW (ref 150–400)
POTASSIUM SERPL-MCNC: 4 MMOL/L — SIGNIFICANT CHANGE UP (ref 3.5–5.3)
POTASSIUM SERPL-SCNC: 4 MMOL/L — SIGNIFICANT CHANGE UP (ref 3.5–5.3)
RAPID RVP RESULT: SIGNIFICANT CHANGE UP
RBC # BLD: 3.35 M/UL — LOW (ref 4.2–5.8)
RBC # FLD: 13.3 % — SIGNIFICANT CHANGE UP (ref 10.3–14.5)
SODIUM SERPL-SCNC: 128 MMOL/L — LOW (ref 135–145)
TOTAL CHOLESTEROL/HDL RATIO MEASUREMENT: 4.8 RATIO — SIGNIFICANT CHANGE UP (ref 3.4–9.6)
TRIGL SERPL-MCNC: 183 MG/DL — HIGH (ref 10–149)
WBC # BLD: 9.05 K/UL — SIGNIFICANT CHANGE UP (ref 3.8–10.5)
WBC # FLD AUTO: 9.05 K/UL — SIGNIFICANT CHANGE UP (ref 3.8–10.5)

## 2019-05-03 PROCEDURE — 80048 BASIC METABOLIC PNL TOTAL CA: CPT

## 2019-05-03 PROCEDURE — 97116 GAIT TRAINING THERAPY: CPT

## 2019-05-03 PROCEDURE — 87040 BLOOD CULTURE FOR BACTERIA: CPT

## 2019-05-03 PROCEDURE — 97161 PT EVAL LOW COMPLEX 20 MIN: CPT

## 2019-05-03 PROCEDURE — 93306 TTE W/DOPPLER COMPLETE: CPT

## 2019-05-03 PROCEDURE — 71045 X-RAY EXAM CHEST 1 VIEW: CPT | Mod: 26

## 2019-05-03 PROCEDURE — 80061 LIPID PANEL: CPT

## 2019-05-03 PROCEDURE — 87581 M.PNEUMON DNA AMP PROBE: CPT

## 2019-05-03 PROCEDURE — 84443 ASSAY THYROID STIM HORMONE: CPT

## 2019-05-03 PROCEDURE — 84436 ASSAY OF TOTAL THYROXINE: CPT

## 2019-05-03 PROCEDURE — 93005 ELECTROCARDIOGRAM TRACING: CPT

## 2019-05-03 PROCEDURE — 86901 BLOOD TYPING SEROLOGIC RH(D): CPT

## 2019-05-03 PROCEDURE — 85027 COMPLETE CBC AUTOMATED: CPT

## 2019-05-03 PROCEDURE — 82962 GLUCOSE BLOOD TEST: CPT

## 2019-05-03 PROCEDURE — 84480 ASSAY TRIIODOTHYRONINE (T3): CPT

## 2019-05-03 PROCEDURE — 85730 THROMBOPLASTIN TIME PARTIAL: CPT

## 2019-05-03 PROCEDURE — 93306 TTE W/DOPPLER COMPLETE: CPT | Mod: 26

## 2019-05-03 PROCEDURE — 86850 RBC ANTIBODY SCREEN: CPT

## 2019-05-03 PROCEDURE — 81001 URINALYSIS AUTO W/SCOPE: CPT

## 2019-05-03 PROCEDURE — 85610 PROTHROMBIN TIME: CPT

## 2019-05-03 PROCEDURE — 70450 CT HEAD/BRAIN W/O DYE: CPT

## 2019-05-03 PROCEDURE — 87486 CHLMYD PNEUM DNA AMP PROBE: CPT

## 2019-05-03 PROCEDURE — 87798 DETECT AGENT NOS DNA AMP: CPT

## 2019-05-03 PROCEDURE — 70551 MRI BRAIN STEM W/O DYE: CPT

## 2019-05-03 PROCEDURE — 82746 ASSAY OF FOLIC ACID SERUM: CPT

## 2019-05-03 PROCEDURE — 70496 CT ANGIOGRAPHY HEAD: CPT

## 2019-05-03 PROCEDURE — 86900 BLOOD TYPING SEROLOGIC ABO: CPT

## 2019-05-03 PROCEDURE — 80053 COMPREHEN METABOLIC PANEL: CPT

## 2019-05-03 PROCEDURE — 99233 SBSQ HOSP IP/OBS HIGH 50: CPT

## 2019-05-03 PROCEDURE — 97166 OT EVAL MOD COMPLEX 45 MIN: CPT

## 2019-05-03 PROCEDURE — 83036 HEMOGLOBIN GLYCOSYLATED A1C: CPT

## 2019-05-03 PROCEDURE — 87633 RESP VIRUS 12-25 TARGETS: CPT

## 2019-05-03 PROCEDURE — 96374 THER/PROPH/DIAG INJ IV PUSH: CPT | Mod: XU

## 2019-05-03 PROCEDURE — 70498 CT ANGIOGRAPHY NECK: CPT

## 2019-05-03 PROCEDURE — 82607 VITAMIN B-12: CPT

## 2019-05-03 PROCEDURE — 71045 X-RAY EXAM CHEST 1 VIEW: CPT

## 2019-05-03 PROCEDURE — 99285 EMERGENCY DEPT VISIT HI MDM: CPT | Mod: 25

## 2019-05-03 RX ORDER — FEXOFENADINE HCL 30 MG
0 TABLET ORAL
Qty: 0 | Refills: 0 | COMMUNITY

## 2019-05-03 RX ORDER — CHOLECALCIFEROL (VITAMIN D3) 125 MCG
0 CAPSULE ORAL
Qty: 0 | Refills: 0 | COMMUNITY

## 2019-05-03 RX ORDER — PREGABALIN 225 MG/1
0 CAPSULE ORAL
Qty: 0 | Refills: 0 | COMMUNITY

## 2019-05-03 RX ORDER — L.ACIDOPH/B.ANIMALIS/B.LONGUM 15B CELL
0 CAPSULE ORAL
Qty: 0 | Refills: 0 | COMMUNITY

## 2019-05-03 RX ORDER — EZETIMIBE 10 MG/1
1 TABLET ORAL
Qty: 30 | Refills: 0 | OUTPATIENT
Start: 2019-05-03 | End: 2019-06-01

## 2019-05-03 RX ORDER — LOSARTAN POTASSIUM 100 MG/1
1 TABLET, FILM COATED ORAL
Qty: 0 | Refills: 0 | COMMUNITY

## 2019-05-03 RX ORDER — SUCRALFATE 1 G
1 TABLET ORAL
Qty: 60 | Refills: 0 | OUTPATIENT
Start: 2019-05-03 | End: 2019-06-01

## 2019-05-03 RX ORDER — FAMOTIDINE 10 MG/ML
20 INJECTION INTRAVENOUS ONCE
Qty: 0 | Refills: 0 | Status: COMPLETED | OUTPATIENT
Start: 2019-05-03 | End: 2019-05-03

## 2019-05-03 RX ORDER — ASPIRIN/CALCIUM CARB/MAGNESIUM 324 MG
1 TABLET ORAL
Qty: 0 | Refills: 0 | COMMUNITY
Start: 2019-05-03

## 2019-05-03 RX ADMIN — Medication 30 MILLILITER(S): at 08:30

## 2019-05-03 RX ADMIN — Medication 2: at 09:01

## 2019-05-03 RX ADMIN — Medication 81 MILLIGRAM(S): at 11:53

## 2019-05-03 RX ADMIN — ENOXAPARIN SODIUM 40 MILLIGRAM(S): 100 INJECTION SUBCUTANEOUS at 11:53

## 2019-05-03 RX ADMIN — FAMOTIDINE 20 MILLIGRAM(S): 10 INJECTION INTRAVENOUS at 11:52

## 2019-05-03 RX ADMIN — Medication 2: at 13:41

## 2019-05-03 NOTE — DISCHARGE NOTE PROVIDER - HOSPITAL COURSE
69 yo RH male with PMHx of CAD s/p CABG x 4 (2000), HTN, steroid induced DM, UC (reported in remission as last bleed >20 years ago), 1x seizure (2013 deemed to medication induced), CHF, cholelithiasis, thrombocytopenia and non-alcoholic hepatic cirrhosis presents to ER with complaint of acute episode of difficulty writing with right hand. Sx onset/LNW 9:45AM 5/1/2019. Pt and wife report he writes very fast usually but this morning could not even grasp the pen properly and was unable to open car door or get out of vehicle. Pt also endorsed feeling generally weak but worse on the right. Per wife pt was also speaking slowly. Delay in coming to ER occurred as pt confided to wife at a late while at work. Exam significant for mild-mod RUE and RLE weakness ( weaker) w/ subtle drift, and leaning to right while walking. CTH/CTA were without acute findings. Pt was deemed tPA candidate. Extensive discussion of risks vs. benefits were done with patient and wife, including ample time given for them to weigh the options presented. 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 tPA Bolused at 1:49PM.   NIHSS =1, MRS =0    Patient denies trauma, LOC, fever, chills, HA, vision changes, tinnitus, dysarthria, dysphagia, dizziness, chest pain, palpitations, SOB, nausea, vomiting, diarrhea, dysuria and incontinence.        Impression. On 5/1/19, he developed right hand weakness and clumsiness, and possibly more generalized right hemiparesis or imbalance, but this is uncertain, and was treated with IV TPA. Hemorrhagic lacunar infarct due to small vessel disease.             MRI Head No Cont (05.02.19)    Evidence of acute infarct with associated susceptibility in     the left lentiform nucleus/basal ganglia region not well appreciated on     the prior CT. Foci of susceptibility within the lesion consistent    with a component of hemorrhage. Microvascular ischemic changes.         CT Head No Cont/CT Angio Head/Neck w/ IV Cont (05.01.19)    Unremarkable noncontrast brain CT. Normal CTA of the head and     neck. No intracranial or extra cranial stenosis.        ANTITHROMBOTIC THERAPY: ASA        PULMONARY: protecting airway, saturating well         CARDIOVASCULAR: continue cardiac monitoring                                SBP goal: 110-160        GASTROINTESTINAL:  dysphagia screen passed         Diet: Regular        RENAL: BUN/Cr within normal limits, good urine output; hyponatremia- monitor closely        Na Goal: Greater than 135       Acosta: no        HEMATOLOGY: H/H without acute change, mild thrombocytopenia which will need to be monitored in the setting of antiplatelet use.       DVT ppx: LMWH        ID: febrile overnight, no leukocytosis; UA negative, CXR pending, RVP negative, no overt sign of infection        OTHER:         DISPOSITION: Home with outpatient PT.        CORE MEASURES:          Admission NIHSS:        TPA: YES        LDL/HDL:       Depression Screen: 0       Statin Therapy: yes       Dysphagia Screen: PASS       Smoking NO        Afib NO       Stroke Education NO 69 yo RH male with PMHx of CAD s/p CABG x 4 (2000), HTN, steroid induced DM, UC (reported in remission as last bleed >20 years ago), 1x seizure (2013 deemed to medication induced), CHF, cholelithiasis, thrombocytopenia and non-alcoholic hepatic cirrhosis presents to ER with complaint of acute episode of difficulty writing with right hand. Sx onset/LNW 9:45AM 5/1/2019.  CTH/CTA were without acute findings. Pt was deemed tPA candidate. tPA Bolused at 1:49PM.   NIHSS =1, MRS =0        Impression: Acute lacunar infarct due to small vessel disease.         MRI Head No Cont (05.02.19)    Evidence of acute infarct with associated susceptibility in     the left lentiform nucleus/basal ganglia region not well appreciated on     the prior CT. Foci of susceptibility within the lesion consistent    with a component of hemorrhage. Microvascular ischemic changes.         CT Head No Cont/CT Angio Head/Neck w/ IV Cont (05.01.19)    Unremarkable noncontrast brain CT. Normal CTA of the head and     neck. No intracranial or extra cranial stenosis.        Patient with mild thrombocytopenia as per patient it is chronic and will follow as outpatient with PCP, also mild hyponatremia needs to follow with PCP. Patient will be discharged on ASA and Zetia (LDL 82 and has an allergy to statins) for secondary stroke prevention. Patient evaluated by PT/OT and was recommended home with services. Patient stable for discharge. Patient will follow up with DARCY Espana on 05/17 at 10am.

## 2019-05-03 NOTE — DISCHARGE NOTE PROVIDER - PROVIDER TOKENS
PROVIDER:[TOKEN:[[id number]]] PROVIDER:[TOKEN:[[id number]]],PROVIDER:[TOKEN:[[id number]],FOLLOWUP:[1 week]],PROVIDER:[TOKEN:[[id number]],FOLLOWUP:[2 weeks]]

## 2019-05-03 NOTE — DISCHARGE NOTE PROVIDER - NSDCQMSTROKERISK_NEU_ALL_CORE
High blood pressure/History of a stroke or TIA/Diabetes Blood clotting problems/High blood pressure/Diabetes/History of a stroke or TIA

## 2019-05-03 NOTE — PROGRESS NOTE ADULT - SUBJECTIVE AND OBJECTIVE BOX
THE PATIENT WAS SEEN AND EXAMINED BY ME WITH THE HOUSESTAFF AND STROKE TEAM DURING MORNING ROUNDS.   HPI:  68-year-old right-handed white gentleman PMHx CAD s/p CABG x 4 (2000), HTN, steroid induced DM, UC (reported in remission as last bleed >20 years ago), 1x seizure (2013) CHF, cholelithiasis, thrombocytopenia and non-alcoholic hepatic cirrhosis first evaluated at [facility] on 5/2/19 with right hemiparesis. On 5/1/19, while at work, he felt mildly clumsy while walking. He then had sudden difficulty with his hand writing or holding objects in his right hand. His wife noted a subtle speech disturbance. He was treated with IV TPA. He has a history of 2 seizures years prior to admission, and had been followed by Dr. Cullen Garduno (neurology). He also has a history of CHF, ulcerative colitis, nonalcoholic cirrhosis, and thrombocytopenia. ROS otherwise negative.     SUBJECTIVE: Febrile overnight. No new neurologic complaints.      acetaminophen   Tablet .. 650 milliGRAM(s) Oral every 6 hours PRN  aluminum hydroxide/magnesium hydroxide/simethicone Suspension 30 milliLiter(s) Oral every 6 hours PRN  aspirin  chewable 81 milliGRAM(s) Oral daily  dextrose 40% Gel 15 Gram(s) Oral once PRN  dextrose 5%. 1000 milliLiter(s) IV Continuous <Continuous>  dextrose 50% Injectable 12.5 Gram(s) IV Push once  enoxaparin Injectable 40 milliGRAM(s) SubCutaneous daily  glucagon  Injectable 1 milliGRAM(s) IntraMuscular once PRN  insulin lispro (HumaLOG) corrective regimen sliding scale   SubCutaneous three times a day before meals  insulin lispro (HumaLOG) corrective regimen sliding scale   SubCutaneous at bedtime    PHYSICAL EXAM:   Vital Signs Last 24 Hrs  T(C): 36.9 (03 May 2019 04:00), Max: 38 (02 May 2019 20:15)  T(F): 98.4 (03 May 2019 04:00), Max: 100.4 (02 May 2019 20:15)  HR: 84 (03 May 2019 06:00) (63 - 89)  BP: 154/71 (03 May 2019 06:00) (125/75 - 175/81)  BP(mean): 96 (03 May 2019 06:00) (81 - 113)  RR: 17 (03 May 2019 06:00) (15 - 25)  SpO2: 98% (03 May 2019 06:00) (96% - 100%)    General: No acute distress  HEENT: EOM intact, visual fields full  Abdomen: Soft, nontender, nondistended   Extremities: No edema    NEUROLOGICAL EXAM:  Mental status: Awake, alert, oriented x3, attentive to examiner, no neglect, normal memory, follows commands, speech fluent   Cranial Nerves: No facial asymmetry, no nystagmus, no dysarthria,  tongue midline  Motor exam: deltoids 3/5 bilaterally, most likely due to rotator cuff injury, and otherwise power was 5/5, fine finger movements subtly slowed on the right;  Sensation: Intact to light touch   Coordination/ Gait: No dysmetria, gait not tested    LABS:                        10.3   7.6   )-----------( 124      ( 02 May 2019 22:37 )             28.3    05-03    128<L>  |  90<L>  |  18  ----------------------------<  178<H>  4.0   |  22  |  1.18    Ca    10.0      03 May 2019 06:35    TPro  7.9  /  Alb  4.3  /  TBili  0.5  /  DBili  x   /  AST  37  /  ALT  29  /  AlkPhos  95  05-01  PT/INR - ( 01 May 2019 12:41 )   PT: 11.7 sec;   INR: 1.03 ratio      PTT - ( 01 May 2019 12:41 )  PTT:28.7 sec    IMAGING: Reviewed by me.     MRI Head No Cont (05.02.19)  Evidence of acute infarct with associated susceptibility in   the left lentiform nucleus/basal ganglia region not well appreciated on   the prior CT. Foci of susceptibility within the lesion consistent  with a component of hemorrhage. Microvascular ischemic changes.     CT Head No Cont/CT Angio Head/Neck w/ IV Cont (05.01.19)  Unremarkable noncontrast brain CT. Normal CTA of the head and   neck. No intracranial or extra cranial stenosis.
Subjective: Patient seen and examined. No new events except as noted.   remains in Stroke unit   no cp or sob     REVIEW OF SYSTEMS:    CONSTITUTIONAL: + weakness, fevers or chills  EYES/ENT: No visual changes;  No vertigo or throat pain   NECK: No pain or stiffness  RESPIRATORY: No cough, wheezing, hemoptysis; No shortness of breath  CARDIOVASCULAR: No chest pain or palpitations  GASTROINTESTINAL: No abdominal or epigastric pain. No nausea, vomiting, or hematemesis; No diarrhea or constipation. No melena or hematochezia.  GENITOURINARY: No dysuria, frequency or hematuria  NEUROLOGICAL: + numbness or weakness  SKIN: No itching, burning, rashes, or lesions   All other review of systems is negative unless indicated above.    MEDICATIONS:  MEDICATIONS  (STANDING):  aspirin  chewable 81 milliGRAM(s) Oral daily  dextrose 5%. 1000 milliLiter(s) (50 mL/Hr) IV Continuous <Continuous>  dextrose 50% Injectable 12.5 Gram(s) IV Push once  enoxaparin Injectable 40 milliGRAM(s) SubCutaneous daily  insulin lispro (HumaLOG) corrective regimen sliding scale   SubCutaneous three times a day before meals  insulin lispro (HumaLOG) corrective regimen sliding scale   SubCutaneous at bedtime      PHYSICAL EXAM:  T(C): 37.7 (05-03-19 @ 10:08), Max: 38 (05-02-19 @ 20:15)  HR: 87 (05-03-19 @ 11:17) (63 - 100)  BP: 131/72 (05-03-19 @ 11:17) (125/75 - 168/87)  RR: 14 (05-03-19 @ 10:08) (14 - 25)  SpO2: 97% (05-03-19 @ 11:17) (97% - 100%)  Wt(kg): --  I&O's Summary    02 May 2019 07:01  -  03 May 2019 07:00  --------------------------------------------------------  IN: 0 mL / OUT: 700 mL / NET: -700 mL            Appearance: NAD	  HEENT:   Normal oral mucosa, PERRL, EOMI	  Lymphatic: No lymphadenopathy  Cardiovascular: Normal S1 S2, No JVD, No murmurs, No edema  Respiratory: Lungs clear to auscultation	  Psychiatry: A & O x 3, Mood & affect appropriate  Gastrointestinal:  Soft, Non-tender, + BS	  Skin: No rashes, No ecchymoses, No cyanosis	  Extremities: Decreased range of motion, No clubbing, cyanosis or edema  Vascular: Peripheral pulses palpable 2+ bilaterally  NEUROLOGICAL EXAM:  Mental status: Awake, alert, oriented x3, attentive to examiner, no neglect, normal memory, follows commands, speech fluent   Cranial Nerves: No facial asymmetry, no nystagmus, no dysarthria,  tongue midline  Motor exam: deltoids 3/5 bilaterally, most likely due to rotator cuff injury, and otherwise power was 5/5, fine finger movements subtly slowed on the right;  Sensation: Intact to light touch   Coordination/ Gait: No dysmetria, gait not tested        LABS:    CARDIAC MARKERS:                                10.6   9.05  )-----------( 124      ( 03 May 2019 07:55 )             31.2     05-03    128<L>  |  90<L>  |  18  ----------------------------<  178<H>  4.0   |  22  |  1.18    Ca    10.0      03 May 2019 06:35    TPro  7.9  /  Alb  4.3  /  TBili  0.5  /  DBili  x   /  AST  37  /  ALT  29  /  AlkPhos  95  05-01    proBNP:   Lipid Profile:   HgA1c: Hemoglobin A1C, Whole Blood: 5.8 % (05-03 @ 07:55)    TSH:     0          TELEMETRY: 	  SR  ECG:  	  RADIOLOGY:   < from: MR Head No Cont (05.02.19 @ 15:11) >    EXAM:  MR BRAIN                            PROCEDURE DATE:  05/02/2019            INTERPRETATION:  INDICATIONS:  Rule out stroke, weakness, right side    TECHNIQUE:  Multiplanar imaging was performed using T1 weighted, T2   weighted and FLAIR sequences.  Diffusion weighted and SWI images were   obtained.        COMPARISON EXAMINATION: 10/27/2013    FINDINGS:    Ventricles and sulci:  Ventricles and sulci are within normal limits for   the patient's age.  Intra-axial: Left corona radiata and lentiform nucleus signal   hyperintensity on DWI with restricted diffusion on the ADC map consistent   with a small region of acute infarct. Foci of susceptibility within the   lesion consistent with a component of hemorrhage not seen on the   patient's prior CT study 5/1/2019. Microvascular ischemic changes   involving the periventricular and subcortical white matter.  Visualized sinuses: Ethmoid mucosal thickening. Bilateral maxillary sinus   mucosal thickening  Visualized mastoids:  Normal.  Calvarium:  Normal.  Carotid flow voids:  Present.    Miscellaneous:  None.    IMPRESSION: Evidence of acute infarct with associated susceptibility in   the left lentiform nucleus/basal ganglia region not well appreciated on   the prior CT. Microvascular ischemic changes.                    CHRISTIANA PORRAS M.D., ATTENDING RADIOLOGIST  This document has been electronically signed. May  2 2019  4:00PM                < end of copied text >    DIAGNOSTIC TESTING:  [ ] Echocardiogram:  [ ]  Catheterization:  [ ] Stress Test:    OTHER:

## 2019-05-03 NOTE — DISCHARGE NOTE NURSING/CASE MANAGEMENT/SOCIAL WORK - NSDCPEEMAIL_GEN_ALL_CORE
[facility] for Tobacco Control email [email or web address]

## 2019-05-03 NOTE — DISCHARGE NOTE PROVIDER - NSDCCPCAREPLAN_GEN_ALL_CORE_FT
PRINCIPAL DISCHARGE DIAGNOSIS  Diagnosis: Stroke  Assessment and Plan of Treatment: - f/u with PCP  - f/u with neurology      SECONDARY DISCHARGE DIAGNOSES  Diagnosis: Right hand weakness  Assessment and Plan of Treatment: PRINCIPAL DISCHARGE DIAGNOSIS  Diagnosis: Stroke  Assessment and Plan of Treatment: Please follow up with neurologist in 1 week. Continue taking medications as prescribed. Monitor your blood pressure. Reduce fat, cholesterol and salt in your diet. Increase intake of fruits and vegetables. Limit alcohol to minimum and do not smoke. You may be at risk for falling, make changes to your home to help you walk easier. Keep up to date on vaccinations.  If you experience any symptoms of facial drooping, slurred speech, arm or leg weakness, severe headache, vision changes or any worsening symptoms, notify provider immediatley and return to ER.        SECONDARY DISCHARGE DIAGNOSES  Diagnosis: Thrombocytopenia  Assessment and Plan of Treatment: follow up with PCP for chronic thrombocytopenia

## 2019-05-03 NOTE — DISCHARGE NOTE NURSING/CASE MANAGEMENT/SOCIAL WORK - NSDCDPATPORTLINK_GEN_ALL_CORE
You can access the [facility] Patient Portal, offered by [facility], by registering with the following website: [email or web address]

## 2019-05-03 NOTE — DISCHARGE NOTE PROVIDER - CARE PROVIDER_API CALL
Malorie Wade (NP; RN)  NP in Family Health  [address], [address]  [place] [address]  Phone: [phone]  Fax: [phone]  Follow Up Time: Malorie Wade (NP; RN)  NP in Family Health  [address], [address]  [place] [address]  Phone: [phone]  Fax: [phone]  Follow Up Time:     Shan Yanez (DO)  Cardiology; Internal Medicine  [address], [address]  [place] [address]  Phone: [phone]  Fax: [phone]  Follow Up Time: 1 week    Marc Artis (DO)  Nephrology  [address]  [place] [address]  Phone: [phone]  Fax: [phone]  Follow Up Time: 2 weeks

## 2019-05-03 NOTE — DISCHARGE NOTE NURSING/CASE MANAGEMENT/SOCIAL WORK - NSDCPEWEB_GEN_ALL_CORE
[facility] for Tobacco Control website --- [email or web address] website --- [email or web address]

## 2019-05-03 NOTE — DISCHARGE NOTE NURSING/CASE MANAGEMENT/SOCIAL WORK - NSDCPEPTSTRK_GEN_ALL_CORE
Stroke warning signs and symptoms/Signs and symptoms of stroke/Need for follow up after discharge/Prescribed medications/Risk factors for stroke/Stroke education booklet/Stroke support groups for patients, families, and friends/Call 911 for stroke

## 2019-05-03 NOTE — PROGRESS NOTE ADULT - ASSESSMENT
68-year-old right-handed white gentleman PMHx CAD s/p CABG x 4 (2000), HTN, steroid induced DM, UC (reported in remission as last bleed >20 years ago), 1x seizure (2013) CHF, cholelithiasis, thrombocytopenia and non-alcoholic hepatic cirrhosis first evaluated at [facility] on 5/2/19 with right hemiparesis. On 5/1/19, while at work, he felt mildly clumsy while walking. He then had sudden difficulty with his hand writing or holding objects in his right hand. His wife noted a subtle speech disturbance. He was treated with IV TPA.     Impression. On 5/1/19, he developed right hand weakness and clumsiness, and possibly more generalized right hemiparesis or imbalance, but this is uncertain, and was treated with IV TPA. Hemorrhagic lacunar infarct due to small vessel disease.     NEURO: Neurologically without acute change, MRI brain w/o contrast 24 post tPA shows hemorrhagic lacunar infarct, continued on ASA, Continue close monitoring for neurologic deterioration, LDL pending- continue on high intensity statin, SBP goal <160, Physical therapy/OT pending.    ANTITHROMBOTIC THERAPY: ASA    PULMONARY: protecting airway, saturating well     CARDIOVASCULAR: continue cardiac monitoring                              SBP goal: 110-160    GASTROINTESTINAL:  dysphagia screen passed       Diet: Regular    RENAL: BUN/Cr within normal limits, good urine output; hyponatremia- monitor closely      Na Goal: Greater than 135     Acosta: no    HEMATOLOGY: H/H without acute change, mild thrombocytopenia which will need to be monitored in the setting of antiplatelet use.     DVT ppx: LMWH    ID: febrile overnight, no leukocytosis; UA negative, CXR pending, RVP negative, no overt sign of infection    OTHER:     DISPOSITION: Rehab or home depending on PT eval once stable and workup is complete    CORE MEASURES:        Admission NIHSS:      TPA: YES      LDL/HDL:     Depression Screen: 0     Statin Therapy: yes     Dysphagia Screen: PASS     Smoking NO      Afib NO     Stroke Education NO    Obtain screening lower extremity venous ultrasound in patients who meet 1 or more of the following criteria as patient is high risk for DVT/PE on admission:   [] History of DVT/PE  []Hypercoagulable states (Factor V Leiden, Cancer, OCP, etc. )  []Prolonged immobility (hemiplegia/hemiparesis/post operative or any other extended immobilization)  [] Transferred from outside facility (Rehab or Long term care)  [] Age </= to 50 68-year-old right-handed white gentleman PMHx CAD s/p CABG x 4 (2000), HTN, steroid induced DM, UC (reported in remission as last bleed >20 years ago), 1x seizure (2013) CHF, cholelithiasis, thrombocytopenia and non-alcoholic hepatic cirrhosis first evaluated at [facility] on 5/2/19 with right hemiparesis. On 5/1/19, while at work, he felt mildly clumsy while walking. He then had sudden difficulty with his hand writing or holding objects in his right hand. His wife noted a subtle speech disturbance. He was treated with IV TPA.     Impression. On 5/1/19, he developed right hand weakness and clumsiness, and possibly more generalized right hemiparesis or imbalance, but this is uncertain, and was treated with IV TPA. Hemorrhagic lacunar infarct due to small vessel disease.     NEURO: Neurologically without acute change, MRI brain w/o contrast 24 post tPA shows hemorrhagic lacunar infarct, continued on ASA, Continue close monitoring for neurologic deterioration, LDL pending- continue on high intensity statin, SBP goal <160, Physical therapy/OT pending.    ANTITHROMBOTIC THERAPY: ASA    PULMONARY: CXR shows clear lungs, protecting airway, saturating well     CARDIOVASCULAR: TTE to be done as outpatient                             SBP goal: 110-160    GASTROINTESTINAL:  dysphagia screen passed       Diet: Regular    RENAL: BUN/Cr within normal limits, good urine output; hyponatremia- advised to follow up with PCP.     Na Goal: Greater than 135     Acosta: no    HEMATOLOGY: H/H without acute change, mild thrombocytopenia which will need to be monitored in the setting of antiplatelet use.     DVT ppx: LMWH    ID: febrile overnight, no leukocytosis; UA negative, CXR without evidence of PNA, RVP negative, no overt sign of infection    OTHER: Plan endorsed to patient and wife at the bedside, all questions and concerns addressed.     DISPOSITION: home with close outpatient follow up.     CORE MEASURES:        Admission NIHSS:      TPA: YES      LDL/HDL:     Depression Screen: 0     Statin Therapy: yes     Dysphagia Screen: PASS     Smoking NO      Afib NO     Stroke Education NO    Obtain screening lower extremity venous ultrasound in patients who meet 1 or more of the following criteria as patient is high risk for DVT/PE on admission:   [] History of DVT/PE  []Hypercoagulable states (Factor V Leiden, Cancer, OCP, etc. )  []Prolonged immobility (hemiplegia/hemiparesis/post operative or any other extended immobilization)  [] Transferred from outside facility (Rehab or Long term care)  [] Age </= to 50

## 2019-05-03 NOTE — DISCHARGE NOTE NURSING/CASE MANAGEMENT/SOCIAL WORK - NSDCPNDISPN_GEN_ALL_CORE
Education provided on the pain management plan of care/Safe use, storage and disposal of opioids when prescribed/Side effects of pain management treatment/Opioids not applicable/not prescribed/Activities of daily living, including home environment that might     exacerbate pain or reduce effectiveness of the pain management plan of care as well as strategies to address these issues

## 2019-05-06 RX ORDER — LOSARTAN POTASSIUM 100 MG/1
1 TABLET, FILM COATED ORAL
Qty: 0 | Refills: 0 | COMMUNITY
Start: 2019-05-06

## 2019-05-08 LAB
CULTURE RESULTS: SIGNIFICANT CHANGE UP
CULTURE RESULTS: SIGNIFICANT CHANGE UP
SPECIMEN SOURCE: SIGNIFICANT CHANGE UP
SPECIMEN SOURCE: SIGNIFICANT CHANGE UP

## 2019-05-16 ENCOUNTER — RESULT CHARGE (OUTPATIENT)
Age: 69
End: 2019-05-16

## 2019-05-17 ENCOUNTER — APPOINTMENT (OUTPATIENT)
Dept: NEUROLOGY | Facility: CLINIC | Age: 69
End: 2019-05-17
Payer: MEDICARE

## 2019-05-17 VITALS
BODY MASS INDEX: 22.22 KG/M2 | HEIGHT: 69 IN | WEIGHT: 150 LBS | SYSTOLIC BLOOD PRESSURE: 128 MMHG | HEART RATE: 69 BPM | DIASTOLIC BLOOD PRESSURE: 67 MMHG

## 2019-05-17 DIAGNOSIS — R06.83 SNORING: ICD-10-CM

## 2019-05-17 PROCEDURE — 99214 OFFICE O/P EST MOD 30 MIN: CPT

## 2019-06-11 ENCOUNTER — OTHER (OUTPATIENT)
Age: 69
End: 2019-06-11

## 2019-07-23 ENCOUNTER — APPOINTMENT (OUTPATIENT)
Dept: NEUROLOGY | Facility: CLINIC | Age: 69
End: 2019-07-23
Payer: MEDICARE

## 2019-07-23 PROCEDURE — 93886 INTRACRANIAL COMPLETE STUDY: CPT

## 2019-07-23 PROCEDURE — 93892 TCD EMBOLI DETECT W/O INJ: CPT

## 2019-07-23 PROCEDURE — 93880 EXTRACRANIAL BILAT STUDY: CPT

## 2019-07-24 ENCOUNTER — TRANSCRIPTION ENCOUNTER (OUTPATIENT)
Age: 69
End: 2019-07-24

## 2019-07-24 ENCOUNTER — OTHER (OUTPATIENT)
Age: 69
End: 2019-07-24

## 2019-07-31 ENCOUNTER — APPOINTMENT (OUTPATIENT)
Dept: NEUROLOGY | Facility: CLINIC | Age: 69
End: 2019-07-31
Payer: MEDICARE

## 2019-07-31 VITALS
HEART RATE: 67 BPM | WEIGHT: 150 LBS | SYSTOLIC BLOOD PRESSURE: 126 MMHG | DIASTOLIC BLOOD PRESSURE: 65 MMHG | BODY MASS INDEX: 22.22 KG/M2 | HEIGHT: 69 IN

## 2019-07-31 DIAGNOSIS — I25.10 ATHEROSCLEROTIC HEART DISEASE OF NATIVE CORONARY ARTERY W/OUT ANGINA PECTORIS: ICD-10-CM

## 2019-07-31 DIAGNOSIS — I63.9 CEREBRAL INFARCTION, UNSPECIFIED: ICD-10-CM

## 2019-07-31 PROCEDURE — 95806 SLEEP STUDY UNATT&RESP EFFT: CPT

## 2019-07-31 PROCEDURE — 99214 OFFICE O/P EST MOD 30 MIN: CPT

## 2019-07-31 NOTE — HISTORY OF PRESENT ILLNESS
[FreeTextEntry1] : 68 M who is here for initial visit with me. He last saw Ms. Wade. The following is from her last note:\par \par Mr. Marlow is a 68 year old male PMHx CAD s/p CABG x 4 (2000), HTN, steroid induced DM, UC (reported in remission as last bleed >20 years ago), 1x seizure (2013) CHF, cholelithiasis, thrombocytopenia and non-alcoholic hepatic cirrhosis who presents today for post hospitalization follow up after a lacunar infarct with hemorrhagic transformation s/o IV TPA on 5/1/19\par \par HPI:\par 68-year-old right-handed white gentleman first evaluated at [facility] on 5/2/19 with right hemiparesis. On 5/1/19, while at work, he felt mildly clumsy while walking. He then had sudden difficulty with his hand writing or holding objects in his right hand. His wife noted a subtle speech disturbance. He was treated with IV TPA. He has a history of 2 seizures years prior to admission, and had been followed by Dr. Cullen Garduno (neurology). He also has a history of CHF, ulcerative colitis, nonalcoholic cirrhosis, and thrombocytopenia. ROS otherwise negative. \par \par CT head/CTA head and neck 5.1.19\par Unremarkable noncontrast brain CT. Normal CTA of the head and \par neck. No intracranial or extra cranial stenosis.\par \par MRI head 5.2.19\par Evidence of acute infarct with associated susceptibility in \par the left lentiform nucleus/basal ganglia region not well appreciated on \par the prior CT. Microvascular ischemic changes.\par \par TTE 5.3.19\par 1. Mitral valve prolapse involving the anterior mitral\par leaflet. Minimal mitral regurgitation.\par 2. Mild concentric left ventricular hypertrophy.\par 3. Normal left ventricular systolic function. No segmental\par wall motion abnormalities. Septal buldge is noted.\par 4. Normal right ventricular size and function.\par 5. Estimated pulmonary artery systolic pressure equals 33\par mm Hg, assuming right atrial pressure equals 8 mm Hg,\par consistent with normal pulmonary pressures.\par \par Today he denies any new or worsening neurological signs or symptoms of stroke, TIA, and/or bleeding. He is reportedly compliant with his medication regimen and denies any adverse reactions. Denies any history of DVT, PE. He states he feels back to baseline. He has a long history of medication reactions and is not able to tolerate status due to cutaneous effects. When he was discharged from the hospital he reports have hiccups for a few days. He states it had happened in the past and was relieved with acupuncture. The hiccups have stopped for about three days now. \par \par \par Since his last visit, he had another cardiac cath. He has been discussing the use of repatha as lipid lowering agent. However, he's afraid it may damage his liver. He has already checked with GI and they are fine with him taking it as per wife.

## 2019-07-31 NOTE — PHYSICAL EXAM
[General Appearance - Alert] : alert [Oriented To Time, Place, And Person] : oriented to person, place, and time [Person] : oriented to person [Place] : oriented to place [Time] : oriented to time [Short Term Intact] : short term memory intact [Fluency] : fluency intact [Current Events] : adequate knowledge of current events [Cranial Nerves Optic (II)] : visual acuity intact bilaterally,  visual fields full to confrontation, pupils equal round and reactive to light [Cranial Nerves Oculomotor (III)] : extraocular motion intact [Cranial Nerves Trigeminal (V)] : facial sensation intact symmetrically [Cranial Nerves Facial (VII)] : face symmetrical [Cranial Nerves Vestibulocochlear (VIII)] : hearing was intact bilaterally [Cranial Nerves Glossopharyngeal (IX)] : tongue and palate midline [Cranial Nerves Accessory (XI - Cranial And Spinal)] : head turning and shoulder shrug symmetric [Cranial Nerves Hypoglossal (XII)] : there was no tongue deviation with protrusion [Motor Tone] : muscle tone was normal in all four extremities [Motor Strength] : muscle strength was normal in all four extremities [Sensation Tactile Decrease] : light touch was intact [Coordination - Dysmetria Impaired Finger-to-Nose Bilateral] : not present [1+] : Patella left 1+ [Extraocular Movements] : extraocular movements were intact [Hearing Threshold Finger Rub Not Bath] : hearing was normal [Full Pulse] : the pedal pulses are present [] : no rash [Abnormal Walk] : normal gait

## 2019-07-31 NOTE — DISCUSSION/SUMMARY
[FreeTextEntry1] : 68 M s/p TIA sp tpa \par continue asa 81\par continue bp managmenet\par PT for balance issues\par LDL- consider repatha\par sleep study pending\par \par f/u with stroke team.\par \par Patient was advised to continue to monitor for neurologic symptoms and to notify my office or go to the nearest emergency room if there are any changes. Neurologic issues were discussed with the patient. All questions were answered to complete satisfaction. Education was provided to the patient during this encounter.\par Side effects of the above medications were discussed in detail including but not limited to applicable black box warning and teratogenicity as appropriate. \par Patient was advised to bring previous records to my office. \par \par

## 2019-08-09 ENCOUNTER — TRANSCRIPTION ENCOUNTER (OUTPATIENT)
Age: 69
End: 2019-08-09

## 2019-08-20 NOTE — PHYSICAL THERAPY INITIAL EVALUATION ADULT - LEVEL OF INDEPENDENCE: SIT/SUPINE, REHAB EVAL
--------------------------------------------------------------------------------------------------  Please contact our office with any questions or concerns.     [phone]     services: [phone]    On-call Nephrologist for after hours, weekends and urgent concerns: [phone].    Nephrology Office phone number: [phone] (opt.0), Fax #: [phone]    Nephrology Nurses  - Maricel Reed, RN: [phone]  - Cony Martínez RN: [phone]       
independent

## 2019-10-15 ENCOUNTER — EMERGENCY (EMERGENCY)
Facility: HOSPITAL | Age: 69
LOS: 1 days | Discharge: HOME | End: 2019-10-15
Attending: EMERGENCY MEDICINE
Payer: MEDICARE

## 2019-10-15 VITALS
SYSTOLIC BLOOD PRESSURE: 119 MMHG | WEIGHT: 145.95 LBS | HEIGHT: 69 IN | DIASTOLIC BLOOD PRESSURE: 65 MMHG | RESPIRATION RATE: 18 BRPM | OXYGEN SATURATION: 100 % | HEART RATE: 63 BPM | TEMPERATURE: 98 F

## 2019-10-15 VITALS
SYSTOLIC BLOOD PRESSURE: 118 MMHG | TEMPERATURE: 98 F | DIASTOLIC BLOOD PRESSURE: 67 MMHG | RESPIRATION RATE: 16 BRPM | OXYGEN SATURATION: 99 % | HEART RATE: 60 BPM

## 2019-10-15 PROCEDURE — 70450 CT HEAD/BRAIN W/O DYE: CPT

## 2019-10-15 PROCEDURE — 82962 GLUCOSE BLOOD TEST: CPT

## 2019-10-15 PROCEDURE — 73030 X-RAY EXAM OF SHOULDER: CPT | Mod: 26,RT

## 2019-10-15 PROCEDURE — 73030 X-RAY EXAM OF SHOULDER: CPT

## 2019-10-15 PROCEDURE — 99284 EMERGENCY DEPT VISIT MOD MDM: CPT | Mod: 25

## 2019-10-15 PROCEDURE — 70450 CT HEAD/BRAIN W/O DYE: CPT | Mod: 26

## 2019-10-15 PROCEDURE — 99284 EMERGENCY DEPT VISIT MOD MDM: CPT

## 2019-10-15 NOTE — ED PROVIDER NOTE - OBJECTIVE STATEMENT
70 yo male pmhx stroke w/o residual deficits, CAD s/o stent x 2 s/p CABG x 4 on plavix, T2DM, HTN, HLD, UC presents to the ED c/o R shoulder pain and R sided facial injury s/p mechanical trip and fall. Tripped over curb outside, fell forward and hit R side of forehead on ground w/ resultant lac. Went to urgent care, had lac repaired and received tetanus, sent to ED for further eval. Denies preceding symptoms prior to fall including cp, dizziness, weakness, headache, palpitations. Denies back pain, abd pain, speech/visual changes, gait abnormality, dizziness, n/v/d, seizure activity, bowel/bladder incontinence, wrist pain, numbness/tingling. 70 yo male pmhx stroke w/o residual deficits, CAD s/o stent x 2 s/p CABG x 4 on plavix, T2DM, HTN, HLD, UC presents to the ED c/o R shoulder pain and R sided facial injury s/p mechanical trip and fall. Tripped over curb outside, fell forward and hit R side of forehead on ground w/ resultant lac. Went to urgent care, had lac repaired and received tetanus, sent to ED for further eval. Denies preceding symptoms prior to fall including cp, dizziness, weakness, headache, palpitations. Denies back pain, abd pain, speech/visual changes, gait abnormality, dizziness, n/v/d, seizure activity, bowel/bladder incontinence, wrist pain, numbness/tingling.    Attendinyo male presents s/p fall this AM.  had head trauma to the right temple.  pt is on plavix.  went to urgent care where lac was repaired.  sent here for CT head.

## 2019-10-15 NOTE — ED ADULT NURSE NOTE - NSIMPLEMENTINTERV_GEN_ALL_ED
Implemented All Fall Risk Interventions:  [place] to call system. Call bell, personal items and telephone within reach. Instruct patient to call for assistance. Room bathroom lighting operational. Non-slip footwear when patient is off stretcher. Physically safe environment: no spills, clutter or unnecessary equipment. Stretcher in lowest position, wheels locked, appropriate side rails in place. Provide visual cue, wrist band, yellow gown, etc. Monitor gait and stability. Monitor for mental status changes and reorient to person, place, and time. Review medications for side effects contributing to fall risk. Reinforce activity limits and safety measures with patient and family.

## 2019-10-15 NOTE — ED PROVIDER NOTE - SKIN WOUND TYPE
+ small superficial abrasions noted to tips of digits 2 and 4 of R hand, no open lac, appear clean, no active bleeding. +sutured 1.5 cm laceration noted to R lateral supraorbital margin, clean, no bleeding.

## 2019-10-15 NOTE — ED ADULT NURSE NOTE - OBJECTIVE STATEMENT
70 y/o male A&OX3 with hx od CAD, 2 stents and on Plavix presents to ED for fall. As per pt, tripped on sidewalk this morning and fell forward on the right side face and arm, no LOC. Pt was seen at Urgent Care who administered Tetanus shot and stiches to right eye brow laceration and recommended to go to hospital for CT scan. One inch long laceration to right eyebrow, stiches appear in tact, no active bleeding at this time. Pt denies dizziness, headache and blurry vision. +ROMx4 extremities, +sensation to touchx4 extremities. Abrasions to right hand with band aids placed at Urgent Care. No weakness, numbness or tingling. Denies CP, SOB, fevers, chills, n/v/d. Safety measures maintained with wife at bedside.

## 2019-10-15 NOTE — ED PROVIDER NOTE - UPPER EXTREMITY EXAM, RIGHT
No obvious deformity to R shoulder or arm, mild ttp to lateral humeral head/AC joint. Full AROM with 5/5 strength all joints although endorses pain with full abduction at R shoulder. Sensation intact.

## 2019-10-15 NOTE — ED PROVIDER NOTE - PROGRESS NOTE DETAILS
CT and xray negative for acute pathology. d/w attending, pt cleared for d/c. Advised on strict return precautions and all f/u information and wound care for lac which was already repaired by urgent care. Stable for d/c. - Jevon Murguia PA-C

## 2019-10-15 NOTE — ED PROVIDER NOTE - PATIENT PORTAL LINK FT
You can access the FollowMyHealth Patient Portal offered by [facility] by registering at the following website: [email or web address]. By joining [employer]’s FollowMyHealth portal, you will also be able to view your health information using other applications (apps) compatible with our system.

## 2019-10-15 NOTE — ED PROVIDER NOTE - ENMT, MLM
Head is normocephalic. +1.5 cm sutured laceration noted to R lateral supraorbital notch. Airway patent, Nasal mucosa clear. Mouth with normal mucosa. Throat has no vesicles, no oropharyngeal exudates and uvula is midline. No otorrhea, rhinorrhea or hemotympanum. No maxillary or frontal sinus tenderness. No periorbital ecchymosis or postauricular ecchymosis.

## 2019-10-15 NOTE — ED PROVIDER NOTE - CARE PLAN
Principal Discharge DX:	Injury of head, initial encounter  Secondary Diagnosis:	Laceration of forehead, initial encounter

## 2019-10-15 NOTE — ED PROVIDER NOTE - NSFOLLOWUPINSTRUCTIONS_ED_ALL_ED_FT
1. Follow up with your PMD within 1-2 hours for wound check.  2. Keep sutures covered and dry for 24 hours then clean with soap and water daily.  Apply bacitracin and cover.  Return to ED for suture removal in 5-7 days.  3. Take Tylenol 650 mg every 6 hours as needed for pain.  4. If you develop any increased pain, redness, streaking (red lines), swelling, fever, chills, headache not relieved with over-the-counter medication, nausea/vomiting, dizziness, weakness, speech/visual changes or any other worsening symptoms please return to the ER immediately.

## 2019-10-15 NOTE — ED PROVIDER NOTE - GASTROINTESTINAL, MLM
Abdomen soft, non-tender, no guarding. No wheezing, rales, or rhonchi. Abdomen soft, non-tender, no guarding, no rebound, no rigidity.

## 2020-11-06 NOTE — ED ADULT TRIAGE NOTE - HEART RATE (BEATS/MIN)
Referred by: Christiano Cruz MD; Medical Diagnosis (from order):    Diagnosis Information      Diagnosis    V43.64 (ICD-9-CM) - Z96.642 (ICD-10-CM) - Status post total replacement of left hip                Physical Therapy -  Daily Treatment Note    Visit:  6     SUBJECTIVE                                                                                                             Pt reports doing quite a bit of yard work yesterday and reports great amount of stiffness in left leg today. Reports not taking pain med prior to session as she forgot. States that stiffness has reduced after nustep use.  Functional Change: Lifting left leg is easier and doesn't require leg  to assist. Can sleep on left side without pain. Has been walking around home without cane more and feels more comfortable and steady.    Pain / Symptoms:  Stiffness rating (out of 10): Current: 5    OBJECTIVE                                                                                                                        TREATMENT                                                                                                                  Therapeutic Exercise:  Nustep Workload 3 x 7 minutes LE only  UE support on table:  -hip flexion x 10 bilateral  -hip abduction x 10 bilateral   -knee flexion x 10 bilateral   -double heel raise x 10  -mini squats x 10    Seated sit to stand without UE support x10    Seated piriformis stretch on R only x30 seconds  Seated hamstring stretch x 30 seconds bilateral  Side stepping x 40 feet bilateral    Seated long arch quad 3 second hold x 10 bilateral           Neuromuscular Re-Education:  On foam pad with light UE support on mat table:  Static stance with c-spine flexion/extension x 30 sec  Static stance with c-spine rotation x 30 sec  Static stance in tandem x 30 sec each way    Single leg stance on left only with light UE support x 30 seconds  Single leg stance on left only with light UE support and  added c-spine flexion, extension, and rotation x 30   Single leg stance with contralateral tapping to 3 cones bilateral LEs x 30 sec each    Cone weave without cane  20 ft x 2  Alternating toe taps to cone with step over cone 2 sets x 20 ft      Gait Training:  Ambulated without  ft with cues for heel toe pattern, core and glute activation in order to reduce lateral trunk sway for normalized gait pattern.     Forward/retro walking without  SPC x 40 feet  Tandem walking without SPC x 40 feet   Side stepping without SPC x 40 feet    Skilled input: verbal instruction/cues, as detailed above and posture correction  instruction/cues for: improved upright posture, quad and glute activation with ambulation for reduced lateral trunk sway and increased motor control with single leg standing balance tasks.    Writer verbally educated and received verbal consent for hand placement, positioning of patient, and techniques to be performed today from patient for clothing adjustments for techniques and therapist position for techniques as described above and how they are pertinent to the patient's plan of care.    Home Exercise Program: Bilateral ankle pumps - hourly  Heel slides - 3 session of 2 minutes throughout the day  Quad sets - 2 session of 10 reps throughout the day  Glut sets - 2 session of 10 reps throughout the day.  10/30  UE support on table:  -hip flexion x 10 bilateral  -hip abduction x 10 bilateral   -knee flexion x 10 bilateral   -double heel raise x 10  -mini squats x 8  11/6  -seated knee extension x 10 reps bilateral (advised to perform straight leg quad sets for remaining reps if fatigues prior to x 10 reps)  **advised 1 set of standing exercises and 1 set of seated exercises a day     ASSESSMENT                                                                                                             Session consisted of working on lower extremity strengthening, standing balance, and gait training. Pt  has less stiffness at end of session and reports that pain is still at 4/10 though describes as more feeling of fatigue. Pt had greater ease with single leg stance tasks and overall standing balance with less upper extremity support needed to steady self, thus progressed to more challenging standing balance tasks. Decreased cues needed for standing exercises to avoid compensatory movements. Advised that pt continue with standing exercises and seated LAQ for home program and added standing balance at counter top including single leg stance, tandem and narrow JEEVAN with added c-spine flexion, extension, and rotation. Patient requires occasional seated rests during session due to LE fatigue reported.  Pain/symptoms after session: 4        PLAN                                                                                                                           Suggestions for next session as indicated: Progress per plan of care   Trial upright bike for improved confidence of pt using upright bike at home, work on gait with mirror present for visual feedback, continue LE strengthening and standing balance.          Procedures and total treatment time documented Time Entry flowsheet.   63

## 2020-12-07 ENCOUNTER — TRANSCRIPTION ENCOUNTER (OUTPATIENT)
Age: 70
End: 2020-12-07

## 2022-04-01 ENCOUNTER — RESULT REVIEW (OUTPATIENT)
Age: 72
End: 2022-04-01

## 2022-04-09 ENCOUNTER — OUTPATIENT (OUTPATIENT)
Dept: OUTPATIENT SERVICES | Facility: HOSPITAL | Age: 72
LOS: 1 days | End: 2022-04-09
Payer: MEDICARE

## 2022-04-09 DIAGNOSIS — Z11.52 ENCOUNTER FOR SCREENING FOR COVID-19: ICD-10-CM

## 2022-04-09 LAB — SARS-COV-2 RNA SPEC QL NAA+PROBE: SIGNIFICANT CHANGE UP

## 2022-04-09 PROCEDURE — U0003: CPT

## 2022-04-09 PROCEDURE — U0005: CPT

## 2022-04-09 PROCEDURE — C9803: CPT

## 2022-04-12 ENCOUNTER — APPOINTMENT (OUTPATIENT)
Dept: CT IMAGING | Facility: HOSPITAL | Age: 72
End: 2022-04-12

## 2022-04-12 ENCOUNTER — RESULT REVIEW (OUTPATIENT)
Age: 72
End: 2022-04-12

## 2022-04-12 ENCOUNTER — OUTPATIENT (OUTPATIENT)
Dept: OUTPATIENT SERVICES | Facility: HOSPITAL | Age: 72
LOS: 1 days | End: 2022-04-12
Payer: MEDICARE

## 2022-04-12 DIAGNOSIS — Z00.00 ENCOUNTER FOR GENERAL ADULT MEDICAL EXAMINATION WITHOUT ABNORMAL FINDINGS: ICD-10-CM

## 2022-04-12 DIAGNOSIS — D72.10 EOSINOPHILIA, UNSPECIFIED: ICD-10-CM

## 2022-04-12 PROCEDURE — 38222 DX BONE MARROW BX & ASPIR: CPT | Mod: LT

## 2022-04-12 PROCEDURE — 88313 SPECIAL STAINS GROUP 2: CPT

## 2022-04-12 PROCEDURE — 88305 TISSUE EXAM BY PATHOLOGIST: CPT

## 2022-04-12 PROCEDURE — 85097 BONE MARROW INTERPRETATION: CPT

## 2022-04-12 PROCEDURE — 88342 IMHCHEM/IMCYTCHM 1ST ANTB: CPT | Mod: 26,59

## 2022-04-12 PROCEDURE — 88313 SPECIAL STAINS GROUP 2: CPT | Mod: 26

## 2022-04-12 PROCEDURE — 88342 IMHCHEM/IMCYTCHM 1ST ANTB: CPT

## 2022-04-12 PROCEDURE — 88189 FLOWCYTOMETRY/READ 16 & >: CPT

## 2022-04-12 PROCEDURE — 88185 FLOWCYTOMETRY/TC ADD-ON: CPT

## 2022-04-12 PROCEDURE — 87205 SMEAR GRAM STAIN: CPT

## 2022-04-12 PROCEDURE — 88264 CHROMOSOME ANALYSIS 20-25: CPT

## 2022-04-12 PROCEDURE — 88280 CHROMOSOME KARYOTYPE STUDY: CPT

## 2022-04-12 PROCEDURE — 88341 IMHCHEM/IMCYTCHM EA ADD ANTB: CPT

## 2022-04-12 PROCEDURE — 88341 IMHCHEM/IMCYTCHM EA ADD ANTB: CPT | Mod: 26,59

## 2022-04-12 PROCEDURE — 77012 CT SCAN FOR NEEDLE BIOPSY: CPT | Mod: 26

## 2022-04-12 PROCEDURE — 88305 TISSUE EXAM BY PATHOLOGIST: CPT | Mod: 26

## 2022-04-12 PROCEDURE — 81450 HL NEO GSAP 5-50DNA/DNA&RNA: CPT

## 2022-04-12 PROCEDURE — 88237 TISSUE CULTURE BONE MARROW: CPT

## 2022-04-13 LAB — TM INTERPRETATION: SIGNIFICANT CHANGE UP

## 2022-04-15 LAB — HEMATOPATHOLOGY REPORT: SIGNIFICANT CHANGE UP

## 2022-04-22 LAB — CHROM ANALY OVERALL INTERP SPEC-IMP: SIGNIFICANT CHANGE UP

## 2022-05-02 LAB
BLUEBERRY IGG RAST: SIGNIFICANT CHANGE UP
CARP IGE RAST: SIGNIFICANT CHANGE UP
CARROT IGG RAST: SIGNIFICANT CHANGE UP
CAULIFLOWER IGG RAST: SIGNIFICANT CHANGE UP
ONKOSIGHT MYELOID SEQUENCE: NORMAL — SIGNIFICANT CHANGE UP

## 2023-01-31 ENCOUNTER — OUTPATIENT (OUTPATIENT)
Dept: OUTPATIENT SERVICES | Facility: HOSPITAL | Age: 73
LOS: 1 days | End: 2023-01-31
Payer: MEDICARE

## 2023-01-31 ENCOUNTER — APPOINTMENT (OUTPATIENT)
Dept: ULTRASOUND IMAGING | Facility: CLINIC | Age: 73
End: 2023-01-31
Payer: MEDICARE

## 2023-01-31 DIAGNOSIS — E87.1 HYPO-OSMOLALITY AND HYPONATREMIA: ICD-10-CM

## 2023-01-31 DIAGNOSIS — Q61.02 CONGENITAL MULTIPLE RENAL CYSTS: ICD-10-CM

## 2023-01-31 DIAGNOSIS — I10 ESSENTIAL (PRIMARY) HYPERTENSION: ICD-10-CM

## 2023-01-31 DIAGNOSIS — K74.60 UNSPECIFIED CIRRHOSIS OF LIVER: ICD-10-CM

## 2023-01-31 PROCEDURE — 76700 US EXAM ABDOM COMPLETE: CPT | Mod: 26

## 2023-01-31 PROCEDURE — 76700 US EXAM ABDOM COMPLETE: CPT

## 2023-04-21 NOTE — ED ADULT NURSE NOTE - NS ED NOTE ABUSE SUSPICION NEGLECT YN
For medication: tamsulosin (FLOMAX) 0.4 MG Cap    90 Day supply requested? No (100 day)    Comments: in script bin     Pharmacy loaded below: Yes   No

## 2024-01-08 ENCOUNTER — APPOINTMENT (OUTPATIENT)
Dept: ORTHOPEDIC SURGERY | Facility: CLINIC | Age: 74
End: 2024-01-08
Payer: MEDICARE

## 2024-01-08 PROCEDURE — 20553 NJX 1/MLT TRIGGER POINTS 3/>: CPT | Mod: RT

## 2024-01-08 PROCEDURE — 72080 X-RAY EXAM THORACOLMB 2/> VW: CPT

## 2024-01-08 PROCEDURE — 99204 OFFICE O/P NEW MOD 45 MIN: CPT | Mod: 25

## 2024-01-18 ENCOUNTER — APPOINTMENT (OUTPATIENT)
Dept: MRI IMAGING | Facility: CLINIC | Age: 74
End: 2024-01-18
Payer: MEDICARE

## 2024-01-18 ENCOUNTER — OUTPATIENT (OUTPATIENT)
Dept: OUTPATIENT SERVICES | Facility: HOSPITAL | Age: 74
LOS: 1 days | End: 2024-01-18
Payer: MEDICARE

## 2024-01-18 DIAGNOSIS — M54.16 RADICULOPATHY, LUMBAR REGION: ICD-10-CM

## 2024-01-18 PROCEDURE — 72148 MRI LUMBAR SPINE W/O DYE: CPT | Mod: 26,MH

## 2024-01-18 PROCEDURE — 72148 MRI LUMBAR SPINE W/O DYE: CPT

## 2024-01-25 ENCOUNTER — NON-APPOINTMENT (OUTPATIENT)
Age: 74
End: 2024-01-25

## 2024-02-09 ENCOUNTER — APPOINTMENT (OUTPATIENT)
Dept: ORTHOPEDIC SURGERY | Facility: CLINIC | Age: 74
End: 2024-02-09
Payer: MEDICARE

## 2024-02-09 DIAGNOSIS — M54.16 RADICULOPATHY, LUMBAR REGION: ICD-10-CM

## 2024-02-09 PROCEDURE — ZZZZZ: CPT

## 2024-02-18 PROBLEM — M54.16 LUMBAR RADICULAR PAIN: Status: ACTIVE | Noted: 2024-01-08

## 2024-02-29 ENCOUNTER — APPOINTMENT (OUTPATIENT)
Age: 74
End: 2024-02-29
Payer: MEDICARE

## 2024-02-29 ENCOUNTER — APPOINTMENT (OUTPATIENT)
Age: 74
End: 2024-02-29

## 2024-02-29 PROCEDURE — 99213 OFFICE O/P EST LOW 20 MIN: CPT

## 2024-08-29 ENCOUNTER — APPOINTMENT (OUTPATIENT)
Age: 74
End: 2024-08-29
Payer: MEDICARE

## 2024-08-29 PROCEDURE — 99213 OFFICE O/P EST LOW 20 MIN: CPT

## 2024-10-31 ENCOUNTER — APPOINTMENT (OUTPATIENT)
Age: 74
End: 2024-10-31

## 2024-10-31 PROCEDURE — 42100 BIOPSY ROOF OF MOUTH: CPT | Mod: LT

## 2024-10-31 PROCEDURE — 99213 OFFICE O/P EST LOW 20 MIN: CPT | Mod: 25

## 2024-11-08 ENCOUNTER — EMERGENCY (EMERGENCY)
Facility: HOSPITAL | Age: 74
LOS: 1 days | Discharge: HOME | End: 2024-11-08
Attending: EMERGENCY MEDICINE | Admitting: EMERGENCY MEDICINE
Payer: MEDICARE

## 2024-11-08 VITALS
WEIGHT: 139.99 LBS | SYSTOLIC BLOOD PRESSURE: 150 MMHG | OXYGEN SATURATION: 95 % | HEIGHT: 69 IN | RESPIRATION RATE: 16 BRPM | DIASTOLIC BLOOD PRESSURE: 66 MMHG | TEMPERATURE: 98 F | HEART RATE: 59 BPM

## 2024-11-08 PROCEDURE — 99284 EMERGENCY DEPT VISIT MOD MDM: CPT | Mod: GC

## 2024-11-08 PROCEDURE — 93010 ELECTROCARDIOGRAM REPORT: CPT

## 2024-11-08 NOTE — ED PROVIDER NOTE - NSFOLLOWUPINSTRUCTIONS_ED_ALL_ED_FT
-- You should update your primary care physician on your Emergency Department visit and follow up with them.  If you do not have a physician or have difficulty following up, please call: [phone] to obtain a [facility] doctor or specialist who can provide follow up.    -- Return to the ER for worsening or persistent symptoms, and/or ANY NEW OR CONCERNING SYMPTOMS. -- If you continue to have bleeding, hold direct pressure to the area with gauze.    -- You should update your primary care physician on your Emergency Department visit and follow up with them.  If you do not have a physician or have difficulty following up, please call: [phone] to obtain a [facility] doctor or specialist who can provide follow up.    -- Return to the ER for worsening or persistent symptoms, and/or ANY NEW OR CONCERNING SYMPTOMS.

## 2024-11-08 NOTE — CONSULT NOTE ADULT - SUBJECTIVE AND OBJECTIVE BOX
Patient is a 74y old  Male who presents with a chief complaint of bleeding on upper left biopsy site.    HPI: Pt is on Xarelto with bleeding from oral biopsy site of the upper left palate. 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 Last night, started to have bleeding from left sided biopsy site that continued till this morning. Unable to get in touch with dental clinic this AM. Went to primary dentist this AM instead and was told to "come to [facility] and wait in ER until you are seen by an on call dentist." Bleeding is currently controlled. No fevers or other systemic symptoms reporte      PAST MEDICAL & SURGICAL HISTORY:  CAD (coronary artery disease)  HTN (hypertension)  Diabetes mellitus  Ulcerative colitis  CHF (congestive heart failure)  Seizure  Cholelithiasis  S/P CABG x 4  S/P laminectomy    MEDICATIONS  (STANDING):    MEDICATIONS  (PRN):      Allergies  NSAIDs (Rash)  Bactrim (Unknown)  Levaquin (Unknown)  predniSONE (Unknown)  statins (Swelling; Rash)    Intolerances      Vital Signs Last 24 Hrs  T(C): 36.4 (08 Nov 2024 13:15), Max: 36.4 (08 Nov 2024 13:15)  T(F): 97.5 (08 Nov 2024 13:15), Max: 97.5 (08 Nov 2024 13:15)  HR: 59 (08 Nov 2024 13:15) (59 - 59)  BP: 150/66 (08 Nov 2024 13:15) (150/66 - 150/66)  BP(mean): --  RR: 16 (08 Nov 2024 13:15) (16 - 16)  SpO2: 95% (08 Nov 2024 13:15) (95% - 95%)    Parameters below as of 08 Nov 2024 13:15  Patient On (Oxygen Delivery Method): room air      EOE:  TMJ ( -  ) clicks                    (  -  ) pops                    (  -  ) crepitus             Mandible FROM             Facial bones and MOM grossly intact             ( -  ) trismus             (  - ) LAD             (  - ) swelling             ( -  ) asymmetry             ( -  ) palpation             ( -  ) SOB             ( -  ) dysphagia             (  - ) LOC    IOE:  permanent dentition: grossly intact           hard/soft palate:  (  - ) palatal torus           tongue/FOM WNL           labial/buccal mucosa WNL           ( -  ) percussion           ( +  ) palpation: pt is tender to palpation of left and right palatal biopsy sites, right sided biopsy site is tender to palpation and has a raised area along the mesial           ( -  ) swelling   Patient has two biopsy sites on the palate near the gingival margin of #3 and 4, and #12. Biopsy site on the left is hemastatic and on the right is healing WNL.    ASSESSMENT:  Left sided biopsy site is hemostatic and has a slightly raised clot. Right side is healing WNL. Instructed pt to eat soft lukewarm food, nothing spicy or crunchy. Informed pt that the prolonged bleeding is likely related to his blood thinners but he should not stop the blood thinners. Informed pt that since bleeding is controlled we do not want to disturb the clot.    RECOMMENDATIONS:  1) If clot is dislodged and bleeding starts again, apply finger pressure for 20 minutes. If bleeding does not improve, return to ED.  2) F/U with [facility] dental on 11/13 at 8:45am.  3) If any difficulty swallowing/breathing, fever occur, page dental.     Alison Garland DMD #[id number]  Jillian Colunga DDS #[id number]

## 2024-11-08 NOTE — ED PROVIDER NOTE - PATIENT PORTAL LINK FT
You can access the FollowMyHealth Patient Portal offered by [facility] by registering at the following website: [email or web address]. By joining [facility]’s FollowMyHealth portal, you will also be able to view your health information using other applications (apps) compatible with our system.

## 2024-11-08 NOTE — ED ADULT NURSE NOTE - OBJECTIVE STATEMENT
Pt c/o of tenderness in mouth after biopsy yesterday. Says the left side is more tender. Left side of face is slight red. Denies any other symptom.

## 2024-11-08 NOTE — ED PROVIDER NOTE - CLINICAL SUMMARY MEDICAL DECISION MAKING FREE TEXT BOX
Deneen: pt sent in by his dentist for oral bleeding that has now stopped. seen by dental here. pt stable for dc and fu.

## 2024-11-08 NOTE — ED ADULT TRIAGE NOTE - CHIEF COMPLAINT QUOTE
s/p biopsy done from 2 sites on soft palate 1 week ago. Since yesterday one of the biopsy sites started to bleed. Pt is on Xarelto for cardiac stents x 2 , CABG, femoral artery bypass, seizure, DM2, HTN, CHF, CAD. No active bleeding noted at present. Fingerstick 115

## 2024-11-08 NOTE — ED PROVIDER NOTE - OBJECTIVE STATEMENT
74Y M PMH CAD, CHF, seizure disorder, on Xarelto here with bleeding from oral biopsy site. 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 74Y M PMH CAD, CHF, seizure disorder, on Xarelto here with bleeding from oral biopsy site. 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 Last night, started to have bleeding from one of biopsy sites. Unable to get in touch with dental clinic this AM. Went to primary dentist this AM instead and was told to "come to [facility] and wait in ER until you are seen by an on call dentist." Bleeding is currently controlled. No fevers or other systemic symptoms reported.

## 2024-11-08 NOTE — ED PROVIDER NOTE - PHYSICAL EXAMINATION
General: WN/WD NAD  Head: Atraumatic  Eyes: EOM grossly intact, no scleral icterus  ENT: moist mucous membranes; 2 biopsy sites noted to soft palate, left biopsy site with visible clot, no active bleeding noted, R biopsy site well healing  Neurology: A&Ox4, nonfocal, HUTCHINSON x 4  Respiratory: normal respiratory effort  CV: Extremities warm and well perfused  Abdominal: Soft, non-distended  Extremities: No edema  Skin: No rashes

## 2024-11-08 NOTE — ED PROVIDER NOTE - PROGRESS NOTE DETAILS
Nicole Haines MD PGY-3: spoke with dental resident will see patient in ED Nicole Haines MD PGY-3: dental cleared for outpatient f/u in 1 week. Nicole Haines MD PGY-3: dental cleared for outpatient f/u in 1 week. no bleeding noted. clinic appt information provided and patient dc with wife in stable condition.

## 2024-11-13 ENCOUNTER — APPOINTMENT (OUTPATIENT)
Age: 74
End: 2024-11-13
Payer: SELF-PAY

## 2024-11-13 PROCEDURE — D0171: CPT | Mod: NC

## 2024-12-24 PROBLEM — F10.90 ALCOHOL USE: Status: ACTIVE | Noted: 2017-01-20

## 2025-04-24 ENCOUNTER — APPOINTMENT (OUTPATIENT)
Age: 75
End: 2025-04-24
Payer: MEDICARE

## 2025-04-24 PROCEDURE — 99213 OFFICE O/P EST LOW 20 MIN: CPT
